# Patient Record
Sex: MALE | Race: WHITE | NOT HISPANIC OR LATINO | Employment: FULL TIME | URBAN - METROPOLITAN AREA
[De-identification: names, ages, dates, MRNs, and addresses within clinical notes are randomized per-mention and may not be internally consistent; named-entity substitution may affect disease eponyms.]

---

## 2018-09-17 ENCOUNTER — APPOINTMENT (OUTPATIENT)
Dept: CT IMAGING | Facility: HOSPITAL | Age: 68
End: 2018-09-17
Attending: EMERGENCY MEDICINE

## 2018-09-17 ENCOUNTER — HOSPITAL ENCOUNTER (EMERGENCY)
Facility: HOSPITAL | Age: 68
Discharge: HOME | End: 2018-09-17
Attending: EMERGENCY MEDICINE | Admitting: EMERGENCY MEDICINE

## 2018-09-17 VITALS
SYSTOLIC BLOOD PRESSURE: 139 MMHG | OXYGEN SATURATION: 98 % | RESPIRATION RATE: 18 BRPM | HEART RATE: 65 BPM | DIASTOLIC BLOOD PRESSURE: 98 MMHG | BODY MASS INDEX: 25.87 KG/M2 | HEIGHT: 69 IN | WEIGHT: 174.7 LBS | TEMPERATURE: 98.1 F

## 2018-09-17 DIAGNOSIS — L03.221 CELLULITIS OF NECK: Primary | ICD-10-CM

## 2018-09-17 LAB
ANION GAP SERPL CALCULATED.3IONS-SCNC: 8.9 MMOL/L
BASOPHILS # BLD AUTO: 0.11 10*3/MM3 (ref 0–0.2)
BASOPHILS NFR BLD AUTO: 1.3 % (ref 0–2)
BUN BLD-MCNC: 14 MG/DL (ref 8–23)
BUN/CREAT SERPL: 15.4 (ref 7–25)
CALCIUM SPEC-SCNC: 9.1 MG/DL (ref 8.8–10.5)
CHLORIDE SERPL-SCNC: 103 MMOL/L (ref 98–107)
CO2 SERPL-SCNC: 26.1 MMOL/L (ref 22–29)
CREAT BLD-MCNC: 0.91 MG/DL (ref 0.76–1.27)
DEPRECATED RDW RBC AUTO: 42.4 FL (ref 37–54)
EOSINOPHIL # BLD AUTO: 0.81 10*3/MM3 (ref 0.1–0.3)
EOSINOPHIL NFR BLD AUTO: 9.6 % (ref 0–4)
ERYTHROCYTE [DISTWIDTH] IN BLOOD BY AUTOMATED COUNT: 12.7 % (ref 11.5–14.5)
GFR SERPL CREATININE-BSD FRML MDRD: 83 ML/MIN/1.73
GLUCOSE BLD-MCNC: 87 MG/DL (ref 65–99)
HCT VFR BLD AUTO: 46 % (ref 42–52)
HGB BLD-MCNC: 15.7 G/DL (ref 14–18)
IMM GRANULOCYTES # BLD: 0.03 10*3/MM3 (ref 0–0.03)
IMM GRANULOCYTES NFR BLD: 0.4 % (ref 0–0.5)
LYMPHOCYTES # BLD AUTO: 1.94 10*3/MM3 (ref 0.6–4.8)
LYMPHOCYTES NFR BLD AUTO: 22.9 % (ref 20–45)
MCH RBC QN AUTO: 31.2 PG (ref 27–31)
MCHC RBC AUTO-ENTMCNC: 34.1 G/DL (ref 31–37)
MCV RBC AUTO: 91.3 FL (ref 80–94)
MONOCYTES # BLD AUTO: 0.48 10*3/MM3 (ref 0–1)
MONOCYTES NFR BLD AUTO: 5.7 % (ref 3–8)
NEUTROPHILS # BLD AUTO: 5.11 10*3/MM3 (ref 1.5–8.3)
NEUTROPHILS NFR BLD AUTO: 60.1 % (ref 45–70)
NRBC BLD MANUAL-RTO: 0 /100 WBC (ref 0–0)
PLATELET # BLD AUTO: 268 10*3/MM3 (ref 140–500)
PMV BLD AUTO: 9 FL (ref 7.4–10.4)
POTASSIUM BLD-SCNC: 4.3 MMOL/L (ref 3.5–5.2)
RBC # BLD AUTO: 5.04 10*6/MM3 (ref 4.7–6.1)
SODIUM BLD-SCNC: 138 MMOL/L (ref 136–145)
WBC NRBC COR # BLD: 8.48 10*3/MM3 (ref 4.8–10.8)

## 2018-09-17 PROCEDURE — 0 IOPAMIDOL PER 1 ML: Performed by: EMERGENCY MEDICINE

## 2018-09-17 PROCEDURE — 99283 EMERGENCY DEPT VISIT LOW MDM: CPT

## 2018-09-17 PROCEDURE — 80048 BASIC METABOLIC PNL TOTAL CA: CPT | Performed by: EMERGENCY MEDICINE

## 2018-09-17 PROCEDURE — 70491 CT SOFT TISSUE NECK W/DYE: CPT

## 2018-09-17 PROCEDURE — 96365 THER/PROPH/DIAG IV INF INIT: CPT

## 2018-09-17 PROCEDURE — 85025 COMPLETE CBC W/AUTO DIFF WBC: CPT | Performed by: EMERGENCY MEDICINE

## 2018-09-17 PROCEDURE — 99282 EMERGENCY DEPT VISIT SF MDM: CPT | Performed by: EMERGENCY MEDICINE

## 2018-09-17 RX ORDER — CLINDAMYCIN HYDROCHLORIDE 300 MG/1
300 CAPSULE ORAL 4 TIMES DAILY
Qty: 28 CAPSULE | Refills: 0 | Status: SHIPPED | OUTPATIENT
Start: 2018-09-17 | End: 2018-09-24

## 2018-09-17 RX ORDER — CLINDAMYCIN PHOSPHATE 600 MG/50ML
600 INJECTION INTRAVENOUS ONCE
Status: COMPLETED | OUTPATIENT
Start: 2018-09-17 | End: 2018-09-17

## 2018-09-17 RX ORDER — CEPHALEXIN 500 MG/1
500 CAPSULE ORAL 4 TIMES DAILY
COMMUNITY
Start: 2018-09-14 | End: 2018-09-17

## 2018-09-17 RX ORDER — SODIUM CHLORIDE 0.9 % (FLUSH) 0.9 %
10 SYRINGE (ML) INJECTION AS NEEDED
Status: DISCONTINUED | OUTPATIENT
Start: 2018-09-17 | End: 2018-09-17 | Stop reason: HOSPADM

## 2018-09-17 RX ADMIN — IOPAMIDOL 100 ML: 755 INJECTION, SOLUTION INTRAVENOUS at 11:48

## 2018-09-17 RX ADMIN — Medication 10 ML: at 10:49

## 2018-09-17 RX ADMIN — CLINDAMYCIN PHOSPHATE 600 MG: 12 INJECTION, SOLUTION INTRAVENOUS at 12:32

## 2018-09-17 NOTE — ED PROVIDER NOTES
"Subjective   Mr. Justo Castellano is a 68-year-old white male who presents secondary to insect bite right neck.  Patient states he was using a bush hog on 9/7/18. He thinks perhaps he was bitten by a bug.  He noticed a small erythematous lesion.  The following day there was a pimple present.  Patient scratched the area.  In the following days the area became larger with associated erythema and induration.  Patient was seen at [facility] on9/14/18.  He was diagnosed with cellulitis and started on Keflex.  The symptoms continued to worsen.  Yesterday the lesion began draining.  Per patient's wife it was a yellowish in color.  The swelling has actually improved today as compared to yesterday.  Patient returned to [facility] today.  The doctor there \"didn't like how it looked and told me I might need an ultrasound\".  Patient presents to the ER for evaluation        History provided by:  Patient  Abscess   Location:  Head/neck  Head/neck abscess location:  R neck  Size:  4 x 8 cm  Abscess quality: induration and redness    Red streaking: no    Duration:  10 days  Chronicity:  New  Context: insect bite/sting    Context comment:  As described above.  Relieved by:  Nothing  Ineffective treatments:  Oral antibiotics  Associated symptoms: no fever, no headaches, no nausea and no vomiting    Associated symptoms comment:  Swelling and drainage.  The drainage has resolved.  Risk factors: no family hx of MRSA and no prior abscess    Risk factors comment:  Prior brown recluse spider bite.      Review of Systems   Constitutional: Negative for chills, diaphoresis and fever.   HENT: Negative for congestion, ear pain and sore throat.    Eyes: Negative for pain and discharge.   Respiratory: Negative for chest tightness, shortness of breath, wheezing and stridor.    Cardiovascular: Negative for chest pain, palpitations and leg swelling.   Gastrointestinal: Negative for abdominal pain, diarrhea, " nausea and vomiting.   Genitourinary: Negative for dysuria, flank pain, frequency and hematuria.   Musculoskeletal: Negative for back pain, myalgias, neck pain and neck stiffness.   Skin: Negative for color change, pallor and rash.   Neurological: Negative for dizziness, seizures, syncope and headaches.   Psychiatric/Behavioral: Negative for agitation and confusion. The patient is not nervous/anxious.    All other systems reviewed and are negative.      History reviewed. No pertinent past medical history.    No Known Allergies    Past Surgical History:   Procedure Laterality Date   • HAND SURGERY Right        History reviewed. No pertinent family history.    Social History     Social History   • Marital status:      Social History Main Topics   • Smoking status: Current Every Day Smoker     Packs/day: 1.00     Types: Cigarettes   • Drug use: No     Other Topics Concern   • Not on file           Objective   Physical Exam   Constitutional: He is oriented to person, place, and time. He appears well-developed and well-nourished. No distress.   68 white male sitting on side of bed.  Patient appears in good overall health.  He is friendly and cooperative.  States he is a .  Accompanied by his wife.   HENT:   Head: Normocephalic and atraumatic.   Right Ear: External ear normal.   Left Ear: External ear normal.   Nose: Nose normal.   Mouth/Throat: Oropharynx is clear and moist.   Neck: Trachea normal, normal range of motion and phonation normal. Neck supple. No spinous process tenderness and no muscular tenderness present. No neck rigidity. No tracheal deviation and normal range of motion present.       Cardiovascular: Normal rate, regular rhythm, normal heart sounds and intact distal pulses.  Exam reveals no gallop and no friction rub.    No murmur heard.  Pulmonary/Chest: Effort normal and breath sounds normal. No stridor. No respiratory distress. He has no wheezes. He has no rales. He exhibits no  tenderness.   Abdominal: Soft. He exhibits no distension.   Musculoskeletal: Normal range of motion.   Neurological: He is alert and oriented to person, place, and time.   Skin: Skin is warm and dry. He is not diaphoretic.   Psychiatric: He has a normal mood and affect. His behavior is normal.   Nursing note and vitals reviewed.      Procedures           ED Course  ED Course as of Sep 17 1434   Mon Sep 17, 2018   1039 Patient has large area of induration right anterior lateral neck overlying sternocleidomastoid.  Will obtain baseline lab work and contrasted CT for further definition of this lesion.  [SS]   1218 CBC and CMP are unremarkable.  CT shows cellulitis but no abscess.  Will give patient IV antibiotics prior to discharge.  Placing patient on a broader spectrum antibiotic.  [SS]   1244 Discussed at length with patient on results, diagnosis treatment and follow-up.  Given clindamycin 600 mg IV. Oral clindamycin for home.  [SS]      ED Course User Index  [SS] Cj Holloway MD      Labs Reviewed   CBC WITH AUTO DIFFERENTIAL - Abnormal; Notable for the following:        Result Value    MCH 31.2 (*)     Eosinophil % 9.6 (*)     Eosinophils, Absolute 0.81 (*)     All other components within normal limits   BASIC METABOLIC PANEL    Narrative:     GFR Normal >60  Chronic Kidney Disease <60  Kidney Failure <15   CBC AND DIFFERENTIAL    Narrative:     The following orders were created for panel order CBC & Differential.  Procedure                               Abnormality         Status                     ---------                               -----------         ------                     CBC Auto Differential[[id number]]        Abnormal            Final result                 Please view results for these tests on the individual orders.       Ct Soft Tissue Neck With Contrast    Result Date: 9/17/2018  Narrative: CT NECK  HISTORY: Right-sided neck swelling for 10 days. Possible insect bite.  TECHNIQUE: Axial  images were obtained through the neck with IV contrast. Multiplanar reformats. Radiation dose reduction techniques were utilized, including automated exposure control and exposure modulation based on body size.  Radiation audit for number of CT and nuclear cardiology exams performed in the last year: 0.  FINDINGS: Visualized lung fields demonstrate emphysema. Salivary glands are normal. Thyroid gland is normal. Larynx is normal. Epiglottis is normal. Prevertebral soft tissues are normal. There is skin thickening and subcutaneous fat stranding on the right side of the neck superficial to sternocleidomastoid. This corresponds to the BB skin marker and presumably reflects cellulitis. No evidence of an abscess. No radiopaque foreign body. No soft tissue gas. No mass or adenopathy is seen. There is a mucous retention cyst in the right maxillary sinus.      Impression: Cellulitis on the right side of the neck. No fluid collection or radiopaque foreign body. No soft tissue gas. The remainder of the neck CT is negative.  This report was finalized on 9/17/2018 11:59 AM by Dr. Samuel Chauhan MD.                MDM  Number of Diagnoses or Management Options  Cellulitis of neck: new and requires workup     Amount and/or Complexity of Data Reviewed  Clinical lab tests: reviewed and ordered  Tests in the radiology section of CPT®: reviewed and ordered    Risk of Complications, Morbidity, and/or Mortality  Presenting problems: moderate  Diagnostic procedures: high  Management options: moderate    Patient Progress  Patient progress: improved        Final diagnoses:   Cellulitis of neck            Cj Holloway MD  09/17/18 0129

## 2018-09-17 NOTE — DISCHARGE INSTRUCTIONS
Medication as directed.  Discontinue Keflex.  Wash area 3 times daily with antibacterial soap, pat dry and apply bacitracin or Neosporin.  Continue until healed.  Follow up with your PCP in 2 days for a recheck, sooner if needed.  Return to ED for worsening symptoms, medical emergencies.

## 2018-09-25 ENCOUNTER — OFFICE VISIT (OUTPATIENT)
Dept: SURGERY | Facility: CLINIC | Age: 68
End: 2018-09-25

## 2018-09-25 VITALS
WEIGHT: 174 LBS | BODY MASS INDEX: 25.77 KG/M2 | HEIGHT: 69 IN | DIASTOLIC BLOOD PRESSURE: 88 MMHG | SYSTOLIC BLOOD PRESSURE: 138 MMHG

## 2018-09-25 DIAGNOSIS — R23.4 SKIN ESCHAR: Primary | ICD-10-CM

## 2018-09-25 PROCEDURE — 99202 OFFICE O/P NEW SF 15 MIN: CPT | Performed by: SURGERY

## 2018-09-25 PROCEDURE — 11042 DBRDMT SUBQ TIS 1ST 20SQCM/<: CPT | Performed by: SURGERY

## 2018-09-25 NOTE — PROGRESS NOTES
PATIENT INFORMATION  Justo Castellano  POSSIBLE INSECT BITE ON NECK X 3 WEEKS, FIRST WENT TO [facility] AND THEY PLACED HIM ON ABX, THEY REFERRED HIM TO THE ER ON 18, THEY PLACED HIM ON CLINDAMYCIN WHICH HE IS STILL TAKING, SWELLING HAS DECREASED, HAS SEEN DR. RIVERA IN THE PAST     - 1950    CHIEF COMPLAINT  Chief Complaint   Patient presents with   • Mass       HISTORY OF PRESENT ILLNESS  HPI was bluish logging and was bitten by an insect on his right neck.  Says he didn't think much of it initially and scratch the area.  Subsequently swelled and a call the office here but I was out of town.  He subsequently went to an urgent care Center and they placed him on Keflex.  The area worsened he went back to the urgent care Center and they referred him to the emergency room here.  They performed a CT scan and blood work and gave him intravenous Cleocin and send him home on by mouth Cleocin which she is currently taking.  He denies any current fevers or chills or drainage from the area for this specific problems in the area.        REVIEW OF SYSTEMS  Review of Systems   Prior insect bite on his back that required debridement.  Otherwise negative      ACTIVE PROBLEMS  There are no active problems to display for this patient.        PAST MEDICAL HISTORY  History reviewed. No pertinent past medical history.      SURGICAL HISTORY  Past Surgical History:   Procedure Laterality Date   • HAND SURGERY Right    • INCISION AND DRAINAGE ABSCESS      DR. RIVERA - INSECT BITE - YEARS AGO         FAMILY HISTORY  History reviewed. No pertinent family history.      SOCIAL HISTORY  Social History     Occupational History   • Not on file.     Social History Main Topics   • Smoking status: Current Every Day Smoker     Packs/day: 1.00     Types: Cigarettes   • Smokeless tobacco: Not on file   • Alcohol use Not on file   • Drug use: No   • Sexual activity: Not on file         CURRENT MEDICATIONS  No current outpatient  "prescriptions on file.    ALLERGIES  Patient has no known allergies.    VITALS  Vitals:    09/25/18 1012   BP: 138/88   Weight: 78.9 kg (174 lb)   Height: 175.3 cm (69\")       LAST RESULTS   Admission on 09/17/2018, Discharged on 09/17/2018   Component Date Value Ref Range Status   • Glucose 09/17/2018 87  65 - 99 mg/dL Final   • BUN 09/17/2018 14  8 - 23 mg/dL Final   • Creatinine 09/17/2018 0.91  0.76 - 1.27 mg/dL Final   • Sodium 09/17/2018 138  136 - 145 mmol/L Final   • Potassium 09/17/2018 4.3  3.5 - 5.2 mmol/L Final   • Chloride 09/17/2018 103  98 - 107 mmol/L Final   • CO2 09/17/2018 26.1  22.0 - 29.0 mmol/L Final   • Calcium 09/17/2018 9.1  8.8 - 10.5 mg/dL Final   • eGFR Non  Amer 09/17/2018 83  >60 mL/min/1.73 Final   • BUN/Creatinine Ratio 09/17/2018 15.4  7.0 - 25.0 Final   • Anion Gap 09/17/2018 8.9  mmol/L Final   • WBC 09/17/2018 8.48  4.80 - 10.80 10*3/mm3 Final   • RBC 09/17/2018 5.04  4.70 - 6.10 10*6/mm3 Final   • Hemoglobin 09/17/2018 15.7  14.0 - 18.0 g/dL Final   • Hematocrit 09/17/2018 46.0  42.0 - 52.0 % Final   • MCV 09/17/2018 91.3  80.0 - 94.0 fL Final   • MCH 09/17/2018 31.2* 27.0 - 31.0 pg Final   • MCHC 09/17/2018 34.1  31.0 - 37.0 g/dL Final   • RDW 09/17/2018 12.7  11.5 - 14.5 % Final   • RDW-SD 09/17/2018 42.4  37.0 - 54.0 fl Final   • MPV 09/17/2018 9.0  7.4 - 10.4 fL Final   • Platelets 09/17/2018 268  140 - 500 10*3/mm3 Final   • Neutrophil % 09/17/2018 60.1  45.0 - 70.0 % Final   • Lymphocyte % 09/17/2018 22.9  20.0 - 45.0 % Final   • Monocyte % 09/17/2018 5.7  3.0 - 8.0 % Final   • Eosinophil % 09/17/2018 9.6* 0.0 - 4.0 % Final   • Basophil % 09/17/2018 1.3  0.0 - 2.0 % Final   • Immature Grans % 09/17/2018 0.4  0.0 - 0.5 % Final   • Neutrophils, Absolute 09/17/2018 5.11  1.50 - 8.30 10*3/mm3 Final   • Lymphocytes, Absolute 09/17/2018 1.94  0.60 - 4.80 10*3/mm3 Final   • Monocytes, Absolute 09/17/2018 0.48  0.00 - 1.00 10*3/mm3 Final   • Eosinophils, Absolute 09/17/2018 " 0.81* 0.10 - 0.30 10*3/mm3 Final   • Basophils, Absolute 09/17/2018 0.11  0.00 - 0.20 10*3/mm3 Final   • Immature Grans, Absolute 09/17/2018 0.03  0.00 - 0.03 10*3/mm3 Final   • nRBC 09/17/2018 0.0  0.0 - 0.0 /100 WBC Final     Ct Soft Tissue Neck With Contrast    Result Date: 9/17/2018  Narrative: CT NECK  HISTORY: Right-sided neck swelling for 10 days. Possible insect bite.  TECHNIQUE: Axial images were obtained through the neck with IV contrast. Multiplanar reformats. Radiation dose reduction techniques were utilized, including automated exposure control and exposure modulation based on body size.  Radiation audit for number of CT and nuclear cardiology exams performed in the last year: 0.  FINDINGS: Visualized lung fields demonstrate emphysema. Salivary glands are normal. Thyroid gland is normal. Larynx is normal. Epiglottis is normal. Prevertebral soft tissues are normal. There is skin thickening and subcutaneous fat stranding on the right side of the neck superficial to sternocleidomastoid. This corresponds to the BB skin marker and presumably reflects cellulitis. No evidence of an abscess. No radiopaque foreign body. No soft tissue gas. No mass or adenopathy is seen. There is a mucous retention cyst in the right maxillary sinus.      Impression: Cellulitis on the right side of the neck. No fluid collection or radiopaque foreign body. No soft tissue gas. The remainder of the neck CT is negative.  This report was finalized on 9/17/2018 11:59 AM by Dr. Samuel Chauhan MD.        PHYSICAL EXAM  Physical Exam solar white male in no active distress.  On his right neck he is a raised 1.2 cm area with full-thickness skin loss eschar centrally.  There is no evidence of cellulitis or abscess.  I reviewed his ER records by Dr. Holloway and his CT scan was reviewed myself.  There was no evidence of abscess is just consistent with a cellulitis.    ASSESSMENT  Cellulitis with central skin loss      PLAN  The risks benefits  and options were discussed with patient in detail.  He should finish out his antibiotic course.  We prepped his neck locally infiltrated 1% lidocaine with epinephrine and this eschar was removed down to normal-appearing subcutaneous tissue.  Hemostasis was assured.  He should clean the area with peroxide and Q-tip daily and a dry dressing to the site.  I will see him back in the office in one week

## 2018-10-02 ENCOUNTER — OFFICE VISIT (OUTPATIENT)
Dept: SURGERY | Facility: CLINIC | Age: 68
End: 2018-10-02

## 2018-10-02 VITALS
HEIGHT: 69 IN | SYSTOLIC BLOOD PRESSURE: 110 MMHG | WEIGHT: 174 LBS | DIASTOLIC BLOOD PRESSURE: 74 MMHG | BODY MASS INDEX: 25.77 KG/M2

## 2018-10-02 DIAGNOSIS — Z09 SURGICAL FOLLOW-UP CARE: Primary | ICD-10-CM

## 2018-10-02 PROCEDURE — 99024 POSTOP FOLLOW-UP VISIT: CPT | Performed by: SURGERY

## 2018-10-02 NOTE — PROGRESS NOTES
1wk f/u debridement of wound, area is improving, finished ABX  Is without complaints.  The area has markedly improved and is a small amount of eschar in the wound is frederick.  He is off antibiotics.  There is no cellulitis or abscess.  The skin will re-epithelialize underneath the eschar I will see him back when necessary.

## 2019-02-08 ENCOUNTER — APPOINTMENT (OUTPATIENT)
Dept: GENERAL RADIOLOGY | Facility: HOSPITAL | Age: 69
End: 2019-02-08

## 2019-02-08 ENCOUNTER — HOSPITAL ENCOUNTER (EMERGENCY)
Facility: HOSPITAL | Age: 69
Discharge: ANOTHER HOSPITAL | End: 2019-02-08
Attending: EMERGENCY MEDICINE | Admitting: EMERGENCY MEDICINE

## 2019-02-08 ENCOUNTER — HOSPITAL ENCOUNTER (INPATIENT)
Facility: HOSPITAL | Age: 69
LOS: 1 days | Discharge: HOME | End: 2019-02-09
Attending: INTERNAL MEDICINE | Admitting: INTERNAL MEDICINE

## 2019-02-08 VITALS
BODY MASS INDEX: 25.18 KG/M2 | DIASTOLIC BLOOD PRESSURE: 87 MMHG | HEIGHT: 69 IN | HEART RATE: 59 BPM | RESPIRATION RATE: 18 BRPM | OXYGEN SATURATION: 97 % | SYSTOLIC BLOOD PRESSURE: 145 MMHG | TEMPERATURE: 98.1 F | WEIGHT: 170 LBS

## 2019-02-08 DIAGNOSIS — R07.9 CHEST PAIN, UNSPECIFIED TYPE: Primary | ICD-10-CM

## 2019-02-08 DIAGNOSIS — R77.8 ELEVATED TROPONIN LEVEL: ICD-10-CM

## 2019-02-08 DIAGNOSIS — O28.8 NST (NON-STRESS TEST) NONREACTIVE: Primary | ICD-10-CM

## 2019-02-08 DIAGNOSIS — I21.4 NSTEMI (NON-ST ELEVATED MYOCARDIAL INFARCTION) (HCC): ICD-10-CM

## 2019-02-08 LAB
ALBUMIN SERPL-MCNC: 4.3 G/DL (ref 3.5–5.2)
ALBUMIN/GLOB SERPL: 1.4 G/DL
ALP SERPL-CCNC: 64 U/L (ref 40–129)
ALT SERPL W P-5'-P-CCNC: 21 U/L (ref 5–41)
ANION GAP SERPL CALCULATED.3IONS-SCNC: 10.7 MMOL/L
APTT PPP: 31.8 SECONDS (ref 24.3–38.1)
AST SERPL-CCNC: 51 U/L (ref 5–40)
BASOPHILS # BLD AUTO: 0.05 10*3/MM3 (ref 0–0.2)
BASOPHILS NFR BLD AUTO: 0.4 % (ref 0–2)
BILIRUB SERPL-MCNC: 0.5 MG/DL (ref 0.2–1.2)
BUN BLD-MCNC: 12 MG/DL (ref 8–23)
BUN/CREAT SERPL: 15.4 (ref 7–25)
CALCIUM SPEC-SCNC: 9 MG/DL (ref 8.8–10.5)
CHLORIDE SERPL-SCNC: 96 MMOL/L (ref 98–107)
CHOLEST SERPL-MCNC: 141 MG/DL (ref 0–200)
CO2 SERPL-SCNC: 25.3 MMOL/L (ref 22–29)
CREAT BLD-MCNC: 0.78 MG/DL (ref 0.76–1.27)
DEPRECATED RDW RBC AUTO: 40.2 FL (ref 37–54)
EOSINOPHIL # BLD AUTO: 0.13 10*3/MM3 (ref 0.1–0.3)
EOSINOPHIL NFR BLD AUTO: 1 % (ref 0–4)
ERYTHROCYTE [DISTWIDTH] IN BLOOD BY AUTOMATED COUNT: 12.2 % (ref 11.5–14.5)
GFR SERPL CREATININE-BSD FRML MDRD: 100 ML/MIN/1.73
GLOBULIN UR ELPH-MCNC: 3.1 GM/DL
GLUCOSE BLD-MCNC: 105 MG/DL (ref 65–99)
HCT VFR BLD AUTO: 45.6 % (ref 42–52)
HDLC SERPL-MCNC: 37 MG/DL (ref 40–60)
HGB BLD-MCNC: 15.7 G/DL (ref 14–18)
IMM GRANULOCYTES # BLD AUTO: 0.05 10*3/MM3 (ref 0–0.03)
IMM GRANULOCYTES NFR BLD AUTO: 0.4 % (ref 0–0.5)
INR PPP: 1.05 (ref 0.9–1.1)
LDLC SERPL CALC-MCNC: 84 MG/DL (ref 0–100)
LDLC/HDLC SERPL: 2.27 {RATIO}
LYMPHOCYTES # BLD AUTO: 1.87 10*3/MM3 (ref 0.6–4.8)
LYMPHOCYTES NFR BLD AUTO: 14.7 % (ref 20–45)
MCH RBC QN AUTO: 30.9 PG (ref 27–31)
MCHC RBC AUTO-ENTMCNC: 34.4 G/DL (ref 31–37)
MCV RBC AUTO: 89.8 FL (ref 80–94)
MONOCYTES # BLD AUTO: 0.7 10*3/MM3 (ref 0–1)
MONOCYTES NFR BLD AUTO: 5.5 % (ref 3–8)
NEUTROPHILS # BLD AUTO: 9.88 10*3/MM3 (ref 1.5–8.3)
NEUTROPHILS NFR BLD AUTO: 78 % (ref 45–70)
NRBC BLD AUTO-RTO: 0 /100 WBC (ref 0–0)
PLATELET # BLD AUTO: 295 10*3/MM3 (ref 140–500)
PMV BLD AUTO: 8.6 FL (ref 7.4–10.4)
POTASSIUM BLD-SCNC: 4 MMOL/L (ref 3.5–5.2)
PROT SERPL-MCNC: 7.4 G/DL (ref 6–8.5)
PROTHROMBIN TIME: 13.4 SECONDS (ref 12.1–15)
RBC # BLD AUTO: 5.08 10*6/MM3 (ref 4.7–6.1)
SODIUM BLD-SCNC: 132 MMOL/L (ref 136–145)
TRIGL SERPL-MCNC: 100 MG/DL (ref 0–150)
TROPONIN T SERPL-MCNC: 0.23 NG/ML (ref 0–0.03)
TROPONIN T SERPL-MCNC: 0.4 NG/ML (ref 0–0.03)
VLDLC SERPL-MCNC: 20 MG/DL (ref 5–40)
WBC NRBC COR # BLD: 12.68 10*3/MM3 (ref 4.8–10.8)

## 2019-02-08 PROCEDURE — 92921: CPT | Performed by: INTERNAL MEDICINE

## 2019-02-08 PROCEDURE — 99284 EMERGENCY DEPT VISIT MOD MDM: CPT | Performed by: EMERGENCY MEDICINE

## 2019-02-08 PROCEDURE — C1887 CATHETER, GUIDING: HCPCS | Performed by: INTERNAL MEDICINE

## 2019-02-08 PROCEDURE — 84484 ASSAY OF TROPONIN QUANT: CPT | Performed by: INTERNAL MEDICINE

## 2019-02-08 PROCEDURE — 93458 L HRT ARTERY/VENTRICLE ANGIO: CPT | Performed by: INTERNAL MEDICINE

## 2019-02-08 PROCEDURE — 85730 THROMBOPLASTIN TIME PARTIAL: CPT | Performed by: EMERGENCY MEDICINE

## 2019-02-08 PROCEDURE — 93005 ELECTROCARDIOGRAM TRACING: CPT | Performed by: INTERNAL MEDICINE

## 2019-02-08 PROCEDURE — 71046 X-RAY EXAM CHEST 2 VIEWS: CPT

## 2019-02-08 PROCEDURE — 99153 MOD SED SAME PHYS/QHP EA: CPT | Performed by: INTERNAL MEDICINE

## 2019-02-08 PROCEDURE — 85025 COMPLETE CBC W/AUTO DIFF WBC: CPT | Performed by: EMERGENCY MEDICINE

## 2019-02-08 PROCEDURE — G0378 HOSPITAL OBSERVATION PER HR: HCPCS

## 2019-02-08 PROCEDURE — 84484 ASSAY OF TROPONIN QUANT: CPT | Performed by: EMERGENCY MEDICINE

## 2019-02-08 PROCEDURE — 99152 MOD SED SAME PHYS/QHP 5/>YRS: CPT | Performed by: INTERNAL MEDICINE

## 2019-02-08 PROCEDURE — 80061 LIPID PANEL: CPT | Performed by: INTERNAL MEDICINE

## 2019-02-08 PROCEDURE — C1725 CATH, TRANSLUMIN NON-LASER: HCPCS | Performed by: INTERNAL MEDICINE

## 2019-02-08 PROCEDURE — 25010000002 FENTANYL CITRATE (PF) 100 MCG/2ML SOLUTION: Performed by: INTERNAL MEDICINE

## 2019-02-08 PROCEDURE — 92928 PRQ TCAT PLMT NTRAC ST 1 LES: CPT | Performed by: INTERNAL MEDICINE

## 2019-02-08 PROCEDURE — 92921 PR PRQ TRLUML CORONARY ANGIOPLASTY ADDL BRANCH: CPT | Performed by: INTERNAL MEDICINE

## 2019-02-08 PROCEDURE — C1894 INTRO/SHEATH, NON-LASER: HCPCS | Performed by: INTERNAL MEDICINE

## 2019-02-08 PROCEDURE — 0 IOPAMIDOL PER 1 ML: Performed by: INTERNAL MEDICINE

## 2019-02-08 PROCEDURE — 99284 EMERGENCY DEPT VISIT MOD MDM: CPT

## 2019-02-08 PROCEDURE — 93010 ELECTROCARDIOGRAM REPORT: CPT | Performed by: INTERNAL MEDICINE

## 2019-02-08 PROCEDURE — C1769 GUIDE WIRE: HCPCS | Performed by: INTERNAL MEDICINE

## 2019-02-08 PROCEDURE — 25010000002 HEPARIN (PORCINE) PER 1000 UNITS: Performed by: INTERNAL MEDICINE

## 2019-02-08 PROCEDURE — 99220 PR INITIAL OBSERVATION CARE/DAY 70 MINUTES: CPT | Performed by: INTERNAL MEDICINE

## 2019-02-08 PROCEDURE — 4A023N7 MEASUREMENT OF CARDIAC SAMPLING AND PRESSURE, LEFT HEART, PERCUTANEOUS APPROACH: ICD-10-PCS | Performed by: INTERNAL MEDICINE

## 2019-02-08 PROCEDURE — C9600 PERC DRUG-EL COR STENT SING: HCPCS | Performed by: INTERNAL MEDICINE

## 2019-02-08 PROCEDURE — B2151ZZ FLUOROSCOPY OF LEFT HEART USING LOW OSMOLAR CONTRAST: ICD-10-PCS | Performed by: INTERNAL MEDICINE

## 2019-02-08 PROCEDURE — C1874 STENT, COATED/COV W/DEL SYS: HCPCS | Performed by: INTERNAL MEDICINE

## 2019-02-08 PROCEDURE — 85347 COAGULATION TIME ACTIVATED: CPT

## 2019-02-08 PROCEDURE — 96374 THER/PROPH/DIAG INJ IV PUSH: CPT

## 2019-02-08 PROCEDURE — 027034Z DILATION OF CORONARY ARTERY, ONE ARTERY WITH DRUG-ELUTING INTRALUMINAL DEVICE, PERCUTANEOUS APPROACH: ICD-10-PCS | Performed by: INTERNAL MEDICINE

## 2019-02-08 PROCEDURE — 80053 COMPREHEN METABOLIC PANEL: CPT | Performed by: EMERGENCY MEDICINE

## 2019-02-08 PROCEDURE — 85610 PROTHROMBIN TIME: CPT | Performed by: EMERGENCY MEDICINE

## 2019-02-08 PROCEDURE — 93005 ELECTROCARDIOGRAM TRACING: CPT | Performed by: EMERGENCY MEDICINE

## 2019-02-08 PROCEDURE — B2111ZZ FLUOROSCOPY OF MULTIPLE CORONARY ARTERIES USING LOW OSMOLAR CONTRAST: ICD-10-PCS | Performed by: INTERNAL MEDICINE

## 2019-02-08 PROCEDURE — 25010000002 MIDAZOLAM PER 1 MG: Performed by: INTERNAL MEDICINE

## 2019-02-08 DEVICE — XIENCE SIERRA™ EVEROLIMUS ELUTING CORONARY STENT SYSTEM 2.50 MM X 28 MM / RAPID-EXCHANGE
Type: IMPLANTABLE DEVICE | Status: FUNCTIONAL
Brand: XIENCE SIERRA™

## 2019-02-08 RX ORDER — CETIRIZINE HYDROCHLORIDE 5 MG/1
5 TABLET ORAL DAILY PRN
COMMUNITY
End: 2019-06-18

## 2019-02-08 RX ORDER — MIDAZOLAM HYDROCHLORIDE 1 MG/ML
INJECTION INTRAMUSCULAR; INTRAVENOUS AS NEEDED
Status: DISCONTINUED | OUTPATIENT
Start: 2019-02-08 | End: 2019-02-08 | Stop reason: HOSPADM

## 2019-02-08 RX ORDER — SODIUM CHLORIDE 0.9 % (FLUSH) 0.9 %
3 SYRINGE (ML) INJECTION EVERY 12 HOURS SCHEDULED
Status: DISCONTINUED | OUTPATIENT
Start: 2019-02-08 | End: 2019-02-09 | Stop reason: HOSPADM

## 2019-02-08 RX ORDER — AMLODIPINE BESYLATE 5 MG/1
5 TABLET ORAL ONCE
Status: DISCONTINUED | OUTPATIENT
Start: 2019-02-08 | End: 2019-02-08

## 2019-02-08 RX ORDER — HYDROCODONE BITARTRATE AND ACETAMINOPHEN 5; 325 MG/1; MG/1
1 TABLET ORAL EVERY 4 HOURS PRN
Status: DISCONTINUED | OUTPATIENT
Start: 2019-02-08 | End: 2019-02-09 | Stop reason: HOSPADM

## 2019-02-08 RX ORDER — SODIUM CHLORIDE 0.9 % (FLUSH) 0.9 %
10 SYRINGE (ML) INJECTION AS NEEDED
Status: DISCONTINUED | OUTPATIENT
Start: 2019-02-08 | End: 2019-02-08 | Stop reason: HOSPADM

## 2019-02-08 RX ORDER — SODIUM CHLORIDE 0.9 % (FLUSH) 0.9 %
3-10 SYRINGE (ML) INJECTION AS NEEDED
Status: DISCONTINUED | OUTPATIENT
Start: 2019-02-08 | End: 2019-02-09 | Stop reason: HOSPADM

## 2019-02-08 RX ORDER — MORPHINE SULFATE 2 MG/ML
1 INJECTION, SOLUTION INTRAMUSCULAR; INTRAVENOUS EVERY 4 HOURS PRN
Status: DISCONTINUED | OUTPATIENT
Start: 2019-02-08 | End: 2019-02-09 | Stop reason: HOSPADM

## 2019-02-08 RX ORDER — ASPIRIN 325 MG
325 TABLET, DELAYED RELEASE (ENTERIC COATED) ORAL DAILY
Status: DISCONTINUED | OUTPATIENT
Start: 2019-02-08 | End: 2019-02-09 | Stop reason: HOSPADM

## 2019-02-08 RX ORDER — ATORVASTATIN CALCIUM 20 MG/1
20 TABLET, FILM COATED ORAL NIGHTLY
Status: DISCONTINUED | OUTPATIENT
Start: 2019-02-08 | End: 2019-02-09 | Stop reason: HOSPADM

## 2019-02-08 RX ORDER — NITROGLYCERIN 5 MG/ML
INJECTION, SOLUTION INTRAVENOUS AS NEEDED
Status: DISCONTINUED | OUTPATIENT
Start: 2019-02-08 | End: 2019-02-08 | Stop reason: HOSPADM

## 2019-02-08 RX ORDER — CLOPIDOGREL BISULFATE 75 MG/1
75 TABLET ORAL DAILY
Status: DISCONTINUED | OUTPATIENT
Start: 2019-02-09 | End: 2019-02-09 | Stop reason: HOSPADM

## 2019-02-08 RX ORDER — HEPARIN SODIUM 1000 [USP'U]/ML
INJECTION, SOLUTION INTRAVENOUS; SUBCUTANEOUS AS NEEDED
Status: DISCONTINUED | OUTPATIENT
Start: 2019-02-08 | End: 2019-02-08 | Stop reason: HOSPADM

## 2019-02-08 RX ORDER — ALUMINA, MAGNESIA, AND SIMETHICONE 2400; 2400; 240 MG/30ML; MG/30ML; MG/30ML
15 SUSPENSION ORAL ONCE
Status: COMPLETED | OUTPATIENT
Start: 2019-02-08 | End: 2019-02-08

## 2019-02-08 RX ORDER — LIDOCAINE HYDROCHLORIDE 20 MG/ML
INJECTION, SOLUTION INFILTRATION; PERINEURAL AS NEEDED
Status: DISCONTINUED | OUTPATIENT
Start: 2019-02-08 | End: 2019-02-08 | Stop reason: HOSPADM

## 2019-02-08 RX ORDER — FENTANYL CITRATE 50 UG/ML
INJECTION, SOLUTION INTRAMUSCULAR; INTRAVENOUS AS NEEDED
Status: DISCONTINUED | OUTPATIENT
Start: 2019-02-08 | End: 2019-02-08 | Stop reason: HOSPADM

## 2019-02-08 RX ORDER — ASPIRIN 325 MG
325 TABLET ORAL ONCE
Status: COMPLETED | OUTPATIENT
Start: 2019-02-08 | End: 2019-02-08

## 2019-02-08 RX ORDER — SODIUM CHLORIDE 9 MG/ML
75 INJECTION, SOLUTION INTRAVENOUS CONTINUOUS
Status: ACTIVE | OUTPATIENT
Start: 2019-02-08 | End: 2019-02-09

## 2019-02-08 RX ORDER — CLOPIDOGREL BISULFATE 75 MG/1
TABLET ORAL AS NEEDED
Status: DISCONTINUED | OUTPATIENT
Start: 2019-02-08 | End: 2019-02-08 | Stop reason: HOSPADM

## 2019-02-08 RX ORDER — NALOXONE HCL 0.4 MG/ML
0.4 VIAL (ML) INJECTION
Status: DISCONTINUED | OUTPATIENT
Start: 2019-02-08 | End: 2019-02-09 | Stop reason: HOSPADM

## 2019-02-08 RX ORDER — ACETAMINOPHEN 325 MG/1
650 TABLET ORAL EVERY 4 HOURS PRN
Status: DISCONTINUED | OUTPATIENT
Start: 2019-02-08 | End: 2019-02-09 | Stop reason: HOSPADM

## 2019-02-08 RX ORDER — SODIUM CHLORIDE 9 MG/ML
INJECTION, SOLUTION INTRAVENOUS CONTINUOUS PRN
Status: COMPLETED | OUTPATIENT
Start: 2019-02-08 | End: 2019-02-08

## 2019-02-08 RX ADMIN — ASPIRIN 325 MG: 325 TABLET, COATED ORAL at 12:43

## 2019-02-08 RX ADMIN — ALUMINUM HYDROXIDE, MAGNESIUM HYDROXIDE, AND DIMETHICONE 15 ML: 400; 400; 40 SUSPENSION ORAL at 10:53

## 2019-02-08 RX ADMIN — FAMOTIDINE 20 MG: 10 INJECTION, SOLUTION INTRAVENOUS at 10:53

## 2019-02-08 RX ADMIN — ATORVASTATIN CALCIUM 20 MG: 20 TABLET, FILM COATED ORAL at 20:29

## 2019-02-08 RX ADMIN — NITROGLYCERIN 0.5 INCH: 20 OINTMENT TOPICAL at 12:55

## 2019-02-08 RX ADMIN — LIDOCAINE HYDROCHLORIDE 15 ML: 20 SOLUTION ORAL; TOPICAL at 10:53

## 2019-02-08 NOTE — ED PROVIDER NOTES
Subjective   History of Present Illness  History of Present Illness    Chief complaint: Chest pain    Location: Low central chest radiating upward    Quality/Severity:  Mild pain    Timing/Duration: Started last night, continues to smoke    Modifying Factors: Sometimes better with belching    Narrative: This patient presents for evaluation of chest pain symptoms.  He says that last night he had a mild headache and then began developing some pain in the low chest that radiated upward towards the top of the chest and back towards the shoulder blades.  He said this was unusual for him.  He was able to rest through the night.  When he woke up this morning he still had the pain in the chest.  It does not cause any nausea.  It does not seem to affect his breathing at all.  He has not had any cough or congestion symptoms.  He has had some belching and he thinks that it may be related to some indigestion.  He has not tried any medications prior to arrival.  He does smoke.  He has no history of cholesterol problems or diabetes problems or cardiac disease.    Associated Symptoms: As above    Review of Systems   Constitutional: Negative for activity change, appetite change, diaphoresis and fever.   Respiratory: Negative for cough and shortness of breath.    Cardiovascular: Positive for chest pain. Negative for palpitations and leg swelling.   Gastrointestinal: Negative for abdominal pain, diarrhea and vomiting.   Musculoskeletal: Negative for neck pain.   Skin: Negative for color change and rash.   Neurological: Positive for headaches. Negative for syncope and numbness.   All other systems reviewed and are negative.      History reviewed. No pertinent past medical history.    No Known Allergies    Past Surgical History:   Procedure Laterality Date   • HAND SURGERY Right    • INCISION AND DRAINAGE ABSCESS      DR. RIVERA - INSECT BITE - YEARS AGO       History reviewed. No pertinent family history.    Social History      Socioeconomic History   • Marital status:      Spouse name: Not on file   • Number of children: Not on file   • Years of education: Not on file   • Highest education level: Not on file   Tobacco Use   • Smoking status: Current Every Day Smoker     Packs/day: 0.50     Types: Cigarettes   Substance and Sexual Activity   • Alcohol use: No     Frequency: Never   • Drug use: No   • Sexual activity: Defer       ED Triage Vitals [02/08/19 1032]   Temp Heart Rate Resp BP SpO2   98.1 °F (36.7 °C) 63 18 164/87 98 %      Temp src Heart Rate Source Patient Position BP Location FiO2 (%)   Oral Monitor Lying Right arm --         Objective   Physical Exam   Constitutional: He is oriented to person, place, and time. He appears well-developed and well-nourished.  Non-toxic appearance. He does not appear ill. No distress.   Smiling, no distress at all.   HENT:   Head: Normocephalic and atraumatic.   Eyes: EOM are normal. Pupils are equal, round, and reactive to light. Right eye exhibits no discharge. Left eye exhibits no discharge.   Neck: Normal range of motion. Neck supple.   Cardiovascular: Normal rate, regular rhythm and normal pulses. Exam reveals no gallop.   No murmur heard.  Pulmonary/Chest: Effort normal. No respiratory distress. He has no decreased breath sounds. He has no wheezes. He has no rhonchi. He has no rales.   Abdominal: Soft. He exhibits no distension, no ascites and no mass. There is no tenderness. There is no rebound and no guarding.   Musculoskeletal: Normal range of motion. He exhibits no edema or deformity.        Right lower leg: Normal. He exhibits no edema.        Left lower leg: Normal. He exhibits no edema.   Neurological: He is alert and oriented to person, place, and time.   Skin: Skin is warm and dry. No ecchymosis and no rash noted. He is not diaphoretic. No erythema. No pallor.   Psychiatric: He has a normal mood and affect. His behavior is normal. Judgment and thought content normal.    Nursing note and vitals reviewed.    EKG           EKG time/Interp time: 1032/1033  Rhythm/Rate: Sinus rhythm, 63 bpm  P waves and PA: P waves are present, 185 ms  QRS, axis: 80 ms, normal axis   ST and T waves: No acute ischemic changes     Independently interpreted by me contemporaneously with treatment    Results for orders placed or performed during the hospital encounter of 02/08/19   Troponin   Result Value Ref Range    Troponin T 0.231 (C) 0.000 - 0.030 ng/mL   CBC Auto Differential   Result Value Ref Range    WBC 12.68 (H) 4.80 - 10.80 10*3/mm3    RBC 5.08 4.70 - 6.10 10*6/mm3    Hemoglobin 15.7 14.0 - 18.0 g/dL    Hematocrit 45.6 42.0 - 52.0 %    MCV 89.8 80.0 - 94.0 fL    MCH 30.9 27.0 - 31.0 pg    MCHC 34.4 31.0 - 37.0 g/dL    RDW 12.2 11.5 - 14.5 %    RDW-SD 40.2 37.0 - 54.0 fl    MPV 8.6 7.4 - 10.4 fL    Platelets 295 140 - 500 10*3/mm3    Neutrophil % 78.0 (H) 45.0 - 70.0 %    Lymphocyte % 14.7 (L) 20.0 - 45.0 %    Monocyte % 5.5 3.0 - 8.0 %    Eosinophil % 1.0 0.0 - 4.0 %    Basophil % 0.4 0.0 - 2.0 %    Immature Grans % 0.4 0.0 - 0.5 %    Neutrophils, Absolute 9.88 (H) 1.50 - 8.30 10*3/mm3    Lymphocytes, Absolute 1.87 0.60 - 4.80 10*3/mm3    Monocytes, Absolute 0.70 0.00 - 1.00 10*3/mm3    Eosinophils, Absolute 0.13 0.10 - 0.30 10*3/mm3    Basophils, Absolute 0.05 0.00 - 0.20 10*3/mm3    Immature Grans, Absolute 0.05 (H) 0.00 - 0.03 10*3/mm3    nRBC 0.0 0.0 - 0.0 /100 WBC   Comprehensive Metabolic Panel   Result Value Ref Range    Glucose 105 (H) 65 - 99 mg/dL    BUN 12 8 - 23 mg/dL    Creatinine 0.78 0.76 - 1.27 mg/dL    Sodium 132 (L) 136 - 145 mmol/L    Potassium 4.0 3.5 - 5.2 mmol/L    Chloride 96 (L) 98 - 107 mmol/L    CO2 25.3 22.0 - 29.0 mmol/L    Calcium 9.0 8.8 - 10.5 mg/dL    Total Protein 7.4 6.0 - 8.5 g/dL    Albumin 4.30 3.50 - 5.20 g/dL    ALT (SGPT) 21 5 - 41 U/L    AST (SGOT) 51 (H) 5 - 40 U/L    Alkaline Phosphatase 64 40 - 129 U/L    Total Bilirubin 0.5 0.2 - 1.2 mg/dL    eGFR  Non African Amer 100 >60 mL/min/1.73    Globulin 3.1 gm/dL    A/G Ratio 1.4 g/dL    BUN/Creatinine Ratio 15.4 7.0 - 25.0    Anion Gap 10.7 mmol/L   Protime-INR   Result Value Ref Range    Protime 13.4 12.1 - 15.0 Seconds    INR 1.05 0.90 - 1.10   aPTT   Result Value Ref Range    PTT 31.8 24.3 - 38.1 seconds       RADIOLOGY        Study: Chest x-ray    Findings: Negative Chest    Interpreted contemporaneously with treatment by Dr. Alejandra, independently viewed by me    Procedures           ED Course  ED Course as of Feb 08 1253   Fri Feb 08, 2019   1251 I have reviewed the EKG and labs and chest x-ray from today's visit.  The EKG was reassuring for no acute ischemic changes.  Honestly, his clinical presentation was most suggestive of GI problems so I gave him a GI cocktail and Pepcid combination.  However, I was surprised to find that his troponin diet was elevated at 0.231.  This makes me wonder if indeed he did have some ischemic problems or acute coronary syndrome issues last night and this morning.  I consult to Dr. Rodriguez from cardiology and she recommends that we transfer the patient to [place] for further studies.  Patient's blood pressure has been acceptable here with systolics in the 120s and 1:30 range.  However it just recently increased to 150s over 90s after I spoke with him so we'll place him on a little bit of nitroglycerin paste to the chest at this time.  I explained him the plan of recommendation for transfer to [place] and he agrees with the plan as outlined.  [HAILEY]      ED Course User Index  [HAILEY] Casey Chambers MD                  MDM  Number of Diagnoses or Management Options  Chest pain, unspecified type:   Elevated troponin level:      Amount and/or Complexity of Data Reviewed  Clinical lab tests: reviewed and ordered  Tests in the radiology section of CPT®: ordered and reviewed  Decide to obtain previous medical records or to obtain history from someone other than the patient:  yes  Review and summarize past medical records: yes  Discuss the patient with other providers: yes (Dr. Rodriguez)  Independent visualization of images, tracings, or specimens: yes    Risk of Complications, Morbidity, and/or Mortality  Presenting problems: moderate  Diagnostic procedures: moderate  Management options: moderate          Final diagnoses:   Chest pain, unspecified type   Elevated troponin level            Casey Chambers MD  02/08/19 6746

## 2019-02-08 NOTE — H&P
Date of Hospital Visit: [unfilled]ENC@  Encounter Provider: Lizz Rodriguez MD  Place of Service: [facility] CARDIOLOGY  Patient Name: Justo Castellano  :1950  Referral Provider: Lizz Rodriguez MD    Chief complaint    Chest burning.     History of Present Illness    This is a 67yo healthy male who smoke 1/2-1ppd of cigarettes who presented to [place] [facility] with chest burning.  About 3-4 weeks ago, he began noticing chest burning and he thought was related to eating. 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  He finished driving bus.  When he got back, he parked the bus and walked to his jeep.  He has severe episode of substernal burning.  He thought he was in have to sit down but was able to get back to his jeep.  Last night he went to [facility].  He felt okay.  He got home and went to bed 8:30 PM.  He will go to sleep at 10:30 PM with severe chest burning.  It was into his shoulders.  He is finally able to go back to sleep.  At 2:30 he woke again with the same chest burning.  It was severe.  He told his wife he needed to go to the emergency department.    He tries to eat very healthy including lots of fresh vegetables.  He drinks only water.  He uses no caffeine.  Uses no alcohol.  He gets physical once a year and these have been fine.  He has no history of treated hyperlipidemia or hypertension.  He's never had diabetes, myocardial infarction, stroke, blood clots, COPD, renal disease or heart failure.  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  In addition, he does [employer] all summer.  He tries to exercise by walking.    This chest burning has not been brought on by eating but more with activity.    There is no family history of premature CAD.  He is related to Nadia ocampo-corie.       Past Medical History:   Diagnosis Date   • Hypertension        Past Surgical History:    Procedure Laterality Date   • HAND SURGERY Right    • INCISION AND DRAINAGE ABSCESS      DR. RIVERA - INSECT BITE - YEARS AGO       Medications Prior to Admission   Medication Sig Dispense Refill Last Dose   • cetirizine (zyrTEC) 5 MG tablet Take 5 mg by mouth Daily As Needed for Allergies.          Current Meds  Scheduled Meds:  sodium chloride 3 mL Intravenous Q12H     Continuous Infusions:   PRN Meds:.sodium chloride    Allergies as of 02/08/2019   • (No Known Allergies)       Social History     Socioeconomic History   • Marital status:      Spouse name: Not on file   • Number of children: Not on file   • Years of education: Not on file   • Highest education level: Not on file   Social Needs   • Financial resource strain: Not on file   • Food insecurity - worry: Not on file   • Food insecurity - inability: Not on file   • Transportation needs - medical: Not on file   • Transportation needs - non-medical: Not on file   Occupational History   • Not on file   Tobacco Use   • Smoking status: Current Every Day Smoker     Packs/day: 0.50     Years: 40.00     Pack years: 20.00     Types: Cigarettes   Substance and Sexual Activity   • Alcohol use: No     Alcohol/week: 0.0 oz     Frequency: Never   • Drug use: No   • Sexual activity: Defer   Other Topics Concern   • Not on file   Social History Narrative   • Not on file       Family History   Problem Relation Age of Onset   • Diabetes Sister        REVIEW OF SYSTEMS:   12 point ROS was performed and is negative except as outlined in HPI          Objective:   Temp:  [98.1 °F (36.7 °C)] 98.1 °F (36.7 °C)  Heart Rate:  [55-69] 61  Resp:  [18] 18  BP: (126-164)/(78-94) 156/87  Body mass index is 25.1 kg/m².  Flowsheet Rows      First Filed Value   Admission Height  No data   Admission Weight  77.1 kg (170 lb) Documented at 02/08/2019 1500        Vitals:    02/08/19 1444   BP: 156/87   Pulse: 61   Resp: 18   Temp: 98.1 °F (36.7 °C)   SpO2: 97%       General Appearance:     Alert, cooperative, in no acute distress   Head:    Normocephalic, without obvious abnormality, atraumatic   Eyes:            Lids and lashes normal, conjunctivae and sclerae normal, no   icterus, no pallor, corneas clear   Ears:    Ears appear intact with no abnormalities noted   Throat:   No oral lesions, no thrush, oral mucosa moist   Neck:   No adenopathy, supple, trachea midline, no thyromegaly, no   carotid bruit, no JVD   Back:     No kyphosis present, no scoliosis present, no skin lesions, erythema or scars, no tenderness, range of motion normal   Lungs:     Clear to auscultation,respirations regular, even and unlabored    Heart:    Regular rhythm and normal rate, normal S1 and S2, no murmur, no gallop, no rub, no click   Chest Wall:    No abnormalities observed   Abdomen:     Normal bowel sounds, no masses, no organomegaly, soft        non-tender, non-distended, no guarding, no rebound  tenderness   Extremities:   Moves all extremities well, no edema, no cyanosis, no redness   Pulses:   Pulses palpable and equal bilaterally. Normal radial, carotid, dorsalis pedis and posterior tibial pulses bilaterally.    Skin:  Psychiatric:   No bleeding, bruising or rash    Alert and oriented x 3, normal mood and affect                 Lab Review:    Results from last 7 days   Lab Units 02/08/19  1145   SODIUM mmol/L 132*   POTASSIUM mmol/L 4.0   CHLORIDE mmol/L 96*   CO2 mmol/L 25.3   BUN mg/dL 12   CREATININE mg/dL 0.78   CALCIUM mg/dL 9.0   BILIRUBIN mg/dL 0.5   ALK PHOS U/L 64   ALT (SGPT) U/L 21   AST (SGOT) U/L 51*   GLUCOSE mg/dL 105*     Results from last 7 days   Lab Units 02/08/19  1044   TROPONIN T ng/mL 0.231*       Results from last 7 days   Lab Units 02/08/19  1044   WBC 10*3/mm3 12.68*   HEMOGLOBIN g/dL 15.7   HEMATOCRIT % 45.6   PLATELETS 10*3/mm3 295     Results from last 7 days   Lab Units 02/08/19  1145   INR  1.05   APTT seconds 31.8             I personally viewed and interpreted the patient's  EKG/Telemetry data  )  Patient Active Problem List   Diagnosis   • NSTEMI (non-ST elevated myocardial infarction) (CMS/[facility])     Assessment and Plan:    1. NSTEMI, cath today.  Asa given early.  Last symptoms at 2:30am. ntgpaste in place.  2. Tobacco use - advised cessation.   3. Unknown lipids.   4. Elevated blood pressure.     Cath today.  Risks/benefits d/w pt. Check echo.     Lizz Rodriguez MD  02/08/19  3:18 PM.  Time spent in reviewing chart, discussion and examination:

## 2019-02-08 NOTE — ED NOTES
Report called per EMTALA form. [facility] in ED to transport patient to [facility] at this time.      UNA Peñaloza James, RN  02/08/19 2293

## 2019-02-09 VITALS
OXYGEN SATURATION: 95 % | TEMPERATURE: 97.7 F | WEIGHT: 171.3 LBS | BODY MASS INDEX: 25.3 KG/M2 | HEART RATE: 69 BPM | DIASTOLIC BLOOD PRESSURE: 68 MMHG | RESPIRATION RATE: 16 BRPM | SYSTOLIC BLOOD PRESSURE: 103 MMHG

## 2019-02-09 LAB
ANION GAP SERPL CALCULATED.3IONS-SCNC: 12.9 MMOL/L
BUN BLD-MCNC: 13 MG/DL (ref 8–23)
BUN/CREAT SERPL: 15.1 (ref 7–25)
CALCIUM SPEC-SCNC: 8.5 MG/DL (ref 8.6–10.5)
CHLORIDE SERPL-SCNC: 103 MMOL/L (ref 98–107)
CO2 SERPL-SCNC: 22.1 MMOL/L (ref 22–29)
CREAT BLD-MCNC: 0.86 MG/DL (ref 0.76–1.27)
DEPRECATED RDW RBC AUTO: 44.3 FL (ref 37–54)
ERYTHROCYTE [DISTWIDTH] IN BLOOD BY AUTOMATED COUNT: 13.1 % (ref 11.5–14.5)
GFR SERPL CREATININE-BSD FRML MDRD: 89 ML/MIN/1.73
GLUCOSE BLD-MCNC: 98 MG/DL (ref 65–99)
HCT VFR BLD AUTO: 43.5 % (ref 40.4–52.2)
HGB BLD-MCNC: 14.5 G/DL (ref 13.7–17.6)
MCH RBC QN AUTO: 31 PG (ref 27–32.7)
MCHC RBC AUTO-ENTMCNC: 33.3 G/DL (ref 32.6–36.4)
MCV RBC AUTO: 92.9 FL (ref 79.8–96.2)
PLATELET # BLD AUTO: 244 10*3/MM3 (ref 140–500)
PMV BLD AUTO: 9.1 FL (ref 6–12)
POTASSIUM BLD-SCNC: 4.2 MMOL/L (ref 3.5–5.2)
RBC # BLD AUTO: 4.68 10*6/MM3 (ref 4.6–6)
SODIUM BLD-SCNC: 138 MMOL/L (ref 136–145)
WBC NRBC COR # BLD: 9.73 10*3/MM3 (ref 4.5–10.7)

## 2019-02-09 PROCEDURE — 93010 ELECTROCARDIOGRAM REPORT: CPT | Performed by: INTERNAL MEDICINE

## 2019-02-09 PROCEDURE — 99217 PR OBSERVATION CARE DISCHARGE MANAGEMENT: CPT | Performed by: INTERNAL MEDICINE

## 2019-02-09 PROCEDURE — 85027 COMPLETE CBC AUTOMATED: CPT | Performed by: INTERNAL MEDICINE

## 2019-02-09 PROCEDURE — 93005 ELECTROCARDIOGRAM TRACING: CPT | Performed by: INTERNAL MEDICINE

## 2019-02-09 PROCEDURE — 80048 BASIC METABOLIC PNL TOTAL CA: CPT | Performed by: INTERNAL MEDICINE

## 2019-02-09 PROCEDURE — G0378 HOSPITAL OBSERVATION PER HR: HCPCS

## 2019-02-09 PROCEDURE — 94799 UNLISTED PULMONARY SVC/PX: CPT

## 2019-02-09 RX ORDER — ATORVASTATIN CALCIUM 20 MG/1
20 TABLET, FILM COATED ORAL NIGHTLY
Qty: 90 TABLET | Refills: 3 | Status: SHIPPED | OUTPATIENT
Start: 2019-02-09 | End: 2019-05-17

## 2019-02-09 RX ORDER — CLOPIDOGREL BISULFATE 75 MG/1
75 TABLET ORAL DAILY
Qty: 90 TABLET | Refills: 3 | Status: SHIPPED | OUTPATIENT
Start: 2019-02-09 | End: 2019-12-07 | Stop reason: SDUPTHER

## 2019-02-09 RX ADMIN — ASPIRIN 325 MG: 325 TABLET, DELAYED RELEASE ORAL at 08:29

## 2019-02-09 RX ADMIN — CLOPIDOGREL 75 MG: 75 TABLET, FILM COATED ORAL at 08:29

## 2019-02-09 NOTE — PLAN OF CARE
Problem: Patient Care Overview  Goal: Plan of Care Review  Outcome: Ongoing (interventions implemented as appropriate)   02/09/19 0816   Coping/Psychosocial   Plan of Care Reviewed With patient   Plan of Care Review   Progress improving   OTHER   Outcome Summary Patient denies complaints. Vitals stable. Discharge paperwork given to and reviewed with patient. Will continue to monitor.

## 2019-02-09 NOTE — DISCHARGE SUMMARY
Date of Discharge:  2/9/2019  Date of Admit: 2/8/2019    Discharge Diagnosis:  NSTEMI (non-ST elevated myocardial infarction) (CMS/[facility])      Hospital Course:     Transferred [facility] on 2/8/19 with non-ST elevation myocardial infarction.  Physician showed normal left main, 20-30% mid LAD, tortuous distal LAD, 10-20% circumflex, 30% mid circumflex, occluded large second obtuse marginal branch, small nondominant RCA with 100% mid occlusion, filled by epicardial vessel from the apical LAD to the RCA.  He underwent stenting of on 2 receiving 0.5 x 20 mm signed serous stent.  This caused 90% stenosis in the circumflex which was then opened with kissing balloon technique into circumflex and OM 2.    He had no chest burning overnight.  no heart racing or skipping.  He overall feels well.    Procedures Performed  Procedure(s):  Left Heart Cath  Coronary angiography  Left ventriculography  Percutaneous Coronary Intervention  Stent ABDULKADIR coronary       Consults     No orders found for last 30 day(s).          Pertinent Test Results:  .      Discharge Physical Exam:    General Appearance No acute distress   Neck No adenopathy, supple, trachea midline, no thyromegaly, no carotid bruit, no JVD   Lungs Clear to auscultation,respirations regular, even and unlabored   Heart Regular rhythm and normal rate, normal S1 and S2, no murmur, no gallop, no rub, no click   Chest wall No abnormalities observed   Abdomen Normal bowel sounds, no masses, no hepatomegaly, soft   Extremities Moves all extremities well, no edema, no cyanosis, no redness   Neurological Alert and oriented x 3     Discharge Medications     Discharge Medications      ASK your doctor about these medications      Instructions Start Date   cetirizine 5 MG tablet  Commonly known as:  zyrTEC   5 mg, Oral, Daily PRN             Discharge Diet:     Activity at Discharge:     Discharge disposition: home    Condition on Discharge: stable    Follow-up Appointments  No  future appointments.      See Serge PEREZ in 1-2 weeks in [place].  Full treadmill stress study in 1 month.  Refer to cardiac rehab    Advised no smoking.     Test Results Pending at Discharge       Lizz Rodriguez MD  02/09/19  7:30 AM    32min spent in reviewing records, discussion and examination of the patient and discussion with other members of the patient's medical team.     Dictated utilizing Dragon dictation

## 2019-02-09 NOTE — DISCHARGE INSTR - LAB
Follow-up with the cardiology office regarding having an echocardiogram performed as an outpatient.

## 2019-02-09 NOTE — PLAN OF CARE
Problem: Patient Care Overview  Goal: Plan of Care Review  Outcome: Ongoing (interventions implemented as appropriate)   02/09/19 0645   Coping/Psychosocial   Plan of Care Reviewed With patient   Plan of Care Review   Progress improving   OTHER   Outcome Summary Vitals stable. No falls. No c/o pain. Right radial cath site C/D/I. Possible d/c this AM. Pt resting comfortably. Monitoring closely.        Problem: Cardiac Catheterization (Diagnostic/Interventional) (Adult)  Goal: Signs and Symptoms of Listed Potential Problems Will be Absent, Minimized or Managed (Cardiac Catheterization)  Outcome: Ongoing (interventions implemented as appropriate)   02/09/19 0645   Goal/Outcome Evaluation   Problems Assessed (Cardiac Catheterization) all   Problems Present (Cardiac Cath) none

## 2019-02-10 ENCOUNTER — READMISSION MANAGEMENT (OUTPATIENT)
Dept: CALL CENTER | Facility: HOSPITAL | Age: 69
End: 2019-02-10

## 2019-02-10 NOTE — OUTREACH NOTE
Prep Survey      Responses   Facility patient discharged from?  [facility]   Is patient eligible?  Yes   Discharge diagnosis   NSTEMI,    Left Heart Cath   Does the patient have one of the following disease processes/diagnoses(primary or secondary)?  Acute MI (STEMI,NSTEMI)   Does the patient have Home health ordered?  No   Is there a DME ordered?  No   Prep survey completed?  Yes          Rachele Flores RN

## 2019-02-11 ENCOUNTER — TELEPHONE (OUTPATIENT)
Dept: CARDIAC REHAB | Facility: HOSPITAL | Age: 69
End: 2019-02-11

## 2019-02-11 ENCOUNTER — TELEPHONE (OUTPATIENT)
Dept: CARDIOLOGY | Facility: CLINIC | Age: 69
End: 2019-02-11

## 2019-02-11 ENCOUNTER — READMISSION MANAGEMENT (OUTPATIENT)
Dept: CALL CENTER | Facility: HOSPITAL | Age: 69
End: 2019-02-11

## 2019-02-11 LAB — ACT BLD: 346 SECONDS (ref 82–152)

## 2019-02-11 NOTE — OUTREACH NOTE
AMI Week 1 Survey      Responses   Facility patient discharged from?  [facility]   Does the patient have one of the following disease processes/diagnoses(primary or secondary)?  Acute MI (STEMI,NSTEMI)   Is there a successful TCM telephone encounter documented?  No   Week 1 attempt successful?  Yes   Call start time  1041   Call end time  1053   Discharge diagnosis   NSTEMI,    Left Heart Cath   Meds reviewed with patient/caregiver?  Yes   Is the patient having any side effects they believe may be caused by any medication additions or changes?  No   Does the patient have all prescriptions related to this admission filled (includes statins,anticoagulants,HTN meds,anti-arrhythmia meds)  Yes   Is the patient taking all medications as directed (includes completed medication regime)?  Yes   Does the patient have a primary care provider?   Yes   Does the patient have an appointment with their PCP,cardiologist,or clinic within 7 days of discharge?  Yes   Has the patient kept scheduled appointments due by today?  Yes   Has home health visited the patient within 72 hours of discharge?  N/A   Psychosocial issues?  No   Did the patient receive a copy of their discharge instructions?  Yes   Nursing interventions  Reviewed instructions with patient   What is the patient's perception of their health status since discharge?  Improving   Nursing interventions  Nurse provided patient education   Is the patient/caregiver able to teach back signs and symptoms of when to call for help immediately:  Sudden chest discomfort, Sudden discomfort in arms, back, neck or jaw, Nausea or vomiting, Dizziness or lightheadedness, Shortness of breath at any time, Sudden sweating or clammy skin, Irregular or rapid heart rate   Nursing interventions  Nurse provided patient education   Is the pateint /caregiver able to teach back the importance of cardiac rehab?  Yes   Nursing interventions  Provided education on importance of cardiac rehab   Is the  patient/caregiver able to teach back lifestyle changes to help prevent MIs  Heart healthy diet, Maintaining a healthy weight   Is the patient/caregiver able to teach back ways to prevent a second heart attack:  Take medications, Follow up with MD, Participate in Cardiac Rehab, Manage risk factors   Is the patient/caregiver able to teach back the hierarchy of who to call/visit for symptoms/problems? PCP, Specialist, Home health nurse, Urgent Care, ED, 911  Yes   Week 1 call completed?  Yes          Jose Martines RN

## 2019-02-11 NOTE — TELEPHONE ENCOUNTER
Pt was here today and wants to know if he can walk a mile and what kind of restriction that he need to know. Pt was hospitalized for chest pain 2/8/19 with non-ST elevation myocardial infarction.  Physician showed normal left main, 20-30% mid LAD, tortuous distal LAD, 10-20% circumflex, 30% mid circumflex, occluded large second obtuse marginal branch, small nondominant RCA with 100% mid occlusion, filled by epicardial vessel from the apical LAD to the RCA.  He underwent stenting of on 2 receiving 0.5 x 20 mm signed serous stent.  This caused 90% stenosis in the circumflex which was then opened with kissing balloon technique into circumflex and OM 2.....Please advise    Pt is scheduled to see Jan APRN on 2/18/19......Pilar PENA

## 2019-02-11 NOTE — TELEPHONE ENCOUNTER
"Called pt secondary to referral for outpatient cardiac rehab.  Pt says he lives in [place] and is anxious to get started in program.  I will route order to [facility].  Pt says he \"threw\" his cigarettes away and \"smoking is over.\"  Also reinforced importance of medication adherence especially ASA and Clopidogrel for stent patency.  Pt understands.  "

## 2019-02-12 DIAGNOSIS — Z95.5 S/P CORONARY ARTERY STENT PLACEMENT: Primary | ICD-10-CM

## 2019-02-13 ENCOUNTER — TELEPHONE (OUTPATIENT)
Dept: CARDIOLOGY | Facility: CLINIC | Age: 69
End: 2019-02-13

## 2019-02-13 NOTE — TELEPHONE ENCOUNTER
Pt called and wants to know if there is any food restrictions. Pt is on Plavix......Please advise.....Pilar DAVE

## 2019-02-14 ENCOUNTER — DOCUMENTATION (OUTPATIENT)
Dept: CARDIOLOGY | Facility: CLINIC | Age: 69
End: 2019-02-14

## 2019-02-14 NOTE — PROGRESS NOTES
Pt walked in to the [place] office today to request a bp check.  Pt's bp in the office 140/80 left hr 68.  Pt does not have a home monitor and can't find a local place to check his bp and is worried about measuring it at home.  Pt recently had stent placed and is on:    Atorvastatin 20 mg qd (pt takes in the morning)  Clopidogrel 75 mg qd  (pt takes in the morning)   mg qd           (pt takes in the morning)    Pt has an post op follow up with CHRIS Munoz on Mon.  I did inform pt that he may want to invest in HBP monitor and he could come to the office and we could instruct him how to use it properly.      Pt would like to know if he should continue to take his meds in the morning or in the evening.  He takes them all about 0600, but develops some dizziness by mid-day that seems to resolve.  Pt also recently stopped smoking Wed.

## 2019-02-18 ENCOUNTER — OFFICE VISIT (OUTPATIENT)
Dept: CARDIOLOGY | Facility: CLINIC | Age: 69
End: 2019-02-18

## 2019-02-18 ENCOUNTER — READMISSION MANAGEMENT (OUTPATIENT)
Dept: CALL CENTER | Facility: HOSPITAL | Age: 69
End: 2019-02-18

## 2019-02-18 VITALS
HEIGHT: 69 IN | WEIGHT: 175 LBS | HEART RATE: 64 BPM | BODY MASS INDEX: 25.92 KG/M2 | SYSTOLIC BLOOD PRESSURE: 140 MMHG | DIASTOLIC BLOOD PRESSURE: 78 MMHG

## 2019-02-18 DIAGNOSIS — I21.4 NSTEMI (NON-ST ELEVATED MYOCARDIAL INFARCTION) (HCC): ICD-10-CM

## 2019-02-18 DIAGNOSIS — Z72.0 TOBACCO ABUSE: ICD-10-CM

## 2019-02-18 DIAGNOSIS — I25.10 CORONARY ARTERY DISEASE INVOLVING NATIVE CORONARY ARTERY OF NATIVE HEART WITHOUT ANGINA PECTORIS: Primary | ICD-10-CM

## 2019-02-18 PROCEDURE — 93000 ELECTROCARDIOGRAM COMPLETE: CPT | Performed by: NURSE PRACTITIONER

## 2019-02-18 PROCEDURE — 99214 OFFICE O/P EST MOD 30 MIN: CPT | Performed by: NURSE PRACTITIONER

## 2019-02-18 NOTE — OUTREACH NOTE
AMI Week 2 Survey      Responses   Facility patient discharged from?  [place]   Does the patient have one of the following disease processes/diagnoses(primary or secondary)?  Acute MI (STEMI,NSTEMI)   Week 2 attempt successful?  Yes   Call start time  1553   Call end time  1554   Meds reviewed with patient/caregiver?  Yes   Is the patient taking all medications as directed (includes completed medication regime)?  Yes   Has the patient kept scheduled appointments due by today?  Yes   What is the patient's perception of their health status since discharge?  Improving   Week 2 call completed?  Yes          Margarita Wetzel RN

## 2019-02-18 NOTE — PROGRESS NOTES
Subjective:     Encounter Date:02/18/2019      Patient ID: Justo Castellano is a 68 y.o. male.    Chief Complaint: hospitali followup for NSTEMI and stents  History of Present Illness  Mr. Del Toro is a new patient to me and I have reviewed his past medical records.  He is a new patient to us and was seen by Dr. Rodriguez in the hospital.    Patient presented to the [facility] ER on 2/8/19 and had an unremarkable EKG but a troponin of 0.231.   He was transferred to [facility] where he had a cardiac catheterization for NSTEMI.   He underwent stenting of OM2 receiving 0.5 x 20 mm signed serous stent.  This caused 90% stenosis in the circumflex which was then opened with kissing balloon technique into circumflex and OM 2.    CORONARY ANGIOGRAPHY: 2/8/19  1.  Left main: 0% stenosis.  The vessel bifurcates into a left anterior descending and left circumflex arteries.  2.  Left anterior descending artery: 0% proximal stenosis.  Proximal vessel gives rise to a moderate caliber first diagonal branch with 0% stenosis.  The mid LAD has diffuse 20-30% stenosis.  Distal LAD is severely tortuous with 0% stenosis.  3.  Left circumflex artery: 10-20% proximal stenosis.  The mid vessel has 30% concentric stenosis.  A small first obtuse marginal branch with a high origin is noted with a 50% ostial stenosis.  There is faint filling seen of a second obtuse marginal branch that appears occluded at the ostium and appears to arise from the mid circumflex artery.  The distal continuation of the circumflex vessel is a large caliber vessel with 0% stenosis that gives rise to a small third obtuse marginal branch followed by a large posterior branch both with no significant disease.  This is a left dominant system  4.  Right coronary artery: Appears to be a small, nondominant vessel with a 90% proximal followed by 100% chronic mid stenosis.  The vessel is filled by a large well-formed epicardial vessel provided by the  apical LAD filling the mid to distal right coronary artery via the RV marginal branch.      LEFT VENTRICULAR HEMODYNAMICS:    1.  1.  Left ventricular pressure: 97/10  2.  Aortic pressure: 98/56     LEFT VENTRICULOGRAPHY:   Normal left ventricle her systolic function and wall motion with an estimated ejection fraction of 65%.     CORONARY INTERVENTION FINDINGS:  PCI CORONARY SEGMENT:  OM2  PRE-STENOSIS: 100%  POST-STENOSIS: 0%  LESION TYPE: C  LOULOU FLOW PRE/POST: 0/3  CULPRIT LESION: yes  DISSECTION: none    He presents today for his one-week follow-up, 2/18/19.  He denies any palpitations, shortness of breath or edema.  He denies any chest pain or chest tightness.  He has had no feelings of lightheadedness or dizziness.  There is been no syncope or presyncopal episodes.  He denies any leg pain, numbness or tingling in his upper or lower extremities.  He denies any snoring.  He denies having any PND, cough, or orthopnea.  He is now on a 325 mg aspirin and Plavix daily.  He denies having any signs and symptoms of unexplained bleeding.    He states prior to this he was walking 1 mile a day and has since resumed walking 1 mile a day without complaints.  He states he has been a lifelong smoker but quit 2 weeks ago.    He was not placed on a beta-blocker postprocedure.  His heart rate today is 64.    The following portions of the patient's history were reviewed and updated as appropriate: allergies, current medications, past family history, past medical history, past social history, past surgical history and problem list.    Review of Systems   Constitution: Negative for weakness and malaise/fatigue.   HENT: Negative for congestion, hoarse voice and sore throat.    Eyes: Negative for blurred vision, double vision, photophobia, vision loss in left eye and vision loss in right eye.   Cardiovascular: Negative for chest pain, dyspnea on exertion, irregular heartbeat, leg swelling, near-syncope, orthopnea, palpitations,  paroxysmal nocturnal dyspnea and syncope.   Respiratory: Negative for cough, hemoptysis, shortness of breath, sleep disturbances due to breathing, snoring, sputum production and wheezing.    Endocrine: Negative.    Hematologic/Lymphatic: Does not bruise/bleed easily.   Skin: Negative for color change, dry skin, poor wound healing and rash.   Musculoskeletal: Negative for back pain, falls, gout, joint pain, joint swelling, muscle cramps and muscle weakness.   Gastrointestinal: Negative for abdominal pain, constipation, diarrhea, dysphagia, melena, nausea and vomiting.   Neurological: Negative for excessive daytime sleepiness, dizziness, headaches, light-headedness, loss of balance, numbness, paresthesias, seizures and vertigo.   Psychiatric/Behavioral: Negative for depression and substance abuse. The patient is not nervous/anxious.        ECG 12 Lead  Date/Time: 2/18/2019 2:04 PM  Performed by: Olga Valdez APRN  Authorized by: Olga Valdez APRN   Comparison: compared with previous ECG   Similar to previous ECG  Rhythm: sinus rhythm  Rate: normal  Conduction: conduction normal  ST Depression: V5 and V6  T elevation: V2 and V3  T inversion: V6, aVF, II and III  QRS axis: normal    Clinical impression: abnormal EKG               Objective:         Physical Exam   Constitutional: He is oriented to person, place, and time. Vital signs are normal. He appears well-developed and well-nourished. No distress.   HENT:   Head: Normocephalic and atraumatic.   Right Ear: Hearing normal.   Left Ear: Hearing normal.   Eyes: Conjunctivae and lids are normal.   Neck: Normal range of motion. Neck supple. No JVD present. Carotid bruit is not present. No thyromegaly present.   Cardiovascular: Normal rate, regular rhythm, S1 normal, S2 normal, normal heart sounds and intact distal pulses.  PMI is not displaced.  Exam reveals no gallop.    No murmur heard.  Pulses:       Carotid pulses are 2+ on the right side, and 2+ on the left  "side.       Radial pulses are 2+ on the right side, and 2+ on the left side.        Dorsalis pedis pulses are 2+ on the right side, and 2+ on the left side.        Posterior tibial pulses are 2+ on the right side, and 2+ on the left side.   Pulmonary/Chest: Effort normal and breath sounds normal. No respiratory distress. He has no wheezes. He has no rhonchi. He has no rales. He exhibits no tenderness.   Abdominal: Soft. Normal appearance and bowel sounds are normal. He exhibits no distension, no abdominal bruit and no mass. There is no tenderness.   Musculoskeletal: Normal range of motion.   Exhibits no edema or deformity   Lymphadenopathy:     He has no cervical adenopathy.   Neurological: He is alert and oriented to person, place, and time. No cranial nerve deficit. Coordination and gait normal.   Oriented to person, place and time.   Skin: Skin is warm, dry and intact. No rash noted. He is not diaphoretic. No cyanosis. Nails show no clubbing.   Psychiatric: He has a normal mood and affect. His speech is normal and behavior is normal. Judgment and thought content normal. Cognition and memory are normal.     Vitals:    02/18/19 0948   BP: 140/78   Pulse: 64   Weight: 79.4 kg (175 lb)   Height: 175.3 cm (69\")           Lab Review:       Assessment:          Diagnosis Plan   1. Coronary artery disease involving native coronary artery of native heart without angina pectoris     2. NSTEMI (non-ST elevated myocardial infarction) (CMS/[facility])     3. Tobacco abuse            Plan:       1/2.  CAD - s/p NSTEMI with stenting 2/9/19.  Denies angina, walking 1 mile a day.  He has stopped smoking 2 weeks ago. He was not discharged on a BB, HR in low 60s at time of visit.  Referral for Cardiac rehab has already been made.    Coronary Artery Disease  Assessment  • The patient has no angina    Plan  • Lifestyle modifications discussed include adhering to a heart healthy diet, avoidance of tobacco products, maintenance of a healthy " weight, medication compliance, regular exercise and regular monitoring of cholesterol and blood pressure    Subjective - Objective  • There is a history of past MI  • There has been a previous stent procedure using ABDULKADIR  • Current antiplatelet therapy includes aspirin 325 mg and clopidogrel 75 mg    3.  Tobaco abuse - states he quit 2 weeks ago.      RTO 1 month with Dr. Jennifer Valdez, APRN      Current Outpatient Medications:   •  aspirin  MG EC tablet, Take 1 tablet by mouth Daily., Disp: 90 tablet, Rfl: 3  •  atorvastatin (LIPITOR) 20 MG tablet, Take 1 tablet by mouth Every Night., Disp: 90 tablet, Rfl: 3  •  cetirizine (zyrTEC) 5 MG tablet, Take 5 mg by mouth Daily As Needed for Allergies., Disp: , Rfl:   •  clopidogrel (PLAVIX) 75 MG tablet, Take 1 tablet by mouth Daily., Disp: 90 tablet, Rfl: 3

## 2019-02-19 ENCOUNTER — TELEPHONE (OUTPATIENT)
Dept: CARDIAC REHAB | Facility: HOSPITAL | Age: 69
End: 2019-02-19

## 2019-02-25 ENCOUNTER — OFFICE VISIT (OUTPATIENT)
Dept: CARDIAC REHAB | Facility: HOSPITAL | Age: 69
End: 2019-02-25

## 2019-02-25 ENCOUNTER — TELEPHONE (OUTPATIENT)
Dept: CARDIOLOGY | Facility: CLINIC | Age: 69
End: 2019-02-25

## 2019-02-25 VITALS — HEIGHT: 69 IN | WEIGHT: 174.7 LBS | BODY MASS INDEX: 25.87 KG/M2

## 2019-02-25 DIAGNOSIS — I21.4 NSTEMI (NON-ST ELEVATED MYOCARDIAL INFARCTION) (HCC): Primary | ICD-10-CM

## 2019-02-25 DIAGNOSIS — I25.10 CORONARY ARTERY DISEASE INVOLVING NATIVE CORONARY ARTERY OF NATIVE HEART WITHOUT ANGINA PECTORIS: Primary | ICD-10-CM

## 2019-02-25 DIAGNOSIS — Z95.5 STATUS POST INSERTION OF DRUG ELUTING CORONARY ARTERY STENT: ICD-10-CM

## 2019-02-25 PROCEDURE — 93797 PHYS/QHP OP CAR RHAB WO ECG: CPT

## 2019-02-25 NOTE — PROGRESS NOTES
Cardiac Rehab Initial Assessment      Name: Justo Castellano  :1950 Allergies:Patient has no known allergies.   MRN: [id number] 69 y.o. Physician: Destin Guillaume MD   Primary Diagnosis:    Diagnosis Plan   1. NSTEMI (non-ST elevated myocardial infarction) (CMS/HCC)     2. Status post insertion of drug eluting coronary artery stent      Event Date:19 Specialist: Dr. Smith   Secondary Diagnosis:  Risk Stratification:Moderate Risk Note Author: Miguelina Avery RN     Cardiovascular History: Comments first heart event     EXERCISE AT HOME  no  Back to walking 3/4 mile daily  N/A    EF: 65%      Source: 19        Ambulatory Status:Independent  Ambulatory Fall Risk Assessed on Initial Visit: yes 6 Minute Walk Pre- Cardiac Rehab:  Distance:1550ft      RPE:1  Max. HR: 80       SPO2:97    MET: 3.2  MPH: 2.9             RPD: 0  Resting BP: 142/90 LA, 142/88 RA    Peak BP: 158/82  Recovery BP: 140/82  Comments: Tolerated well.      NUTRITION  Lipids:yes If yes, labs as follows;  Total: No components found for: CHOLESTEROL  HDL:   HDL Cholesterol   Date Value Ref Range Status   2019 37 (L) 40 - 60 mg/dL Final    Lipids continued:  LDL:  LDL Cholesterol    Date Value Ref Range Status   2019 84 0 - 100 mg/dL Final     Triglyceride: No components found for: TRIGLYCERIDE   Weight Management:                 Weight: 174.7  Height: 69                         BMI: Body mass index is 25.8 kg/m².  Waist Circumference: 39.5  inches   Alcohol Use: defer Diabetes:No    Last HGBA1C with date if applicable:No components found for: A1C         SOCIAL HISTORY  Social History     Socioeconomic History   • Marital status:      Spouse name: Not on file   • Number of children: Not on file   • Years of education: Not on file   • Highest education level: Not on file   Tobacco Use   • Smoking status: Current Every Day Smoker     Packs/day: 0.50     Years: 40.00     Pack years: 20.00     Types: Cigarettes    Substance and Sexual Activity   • Alcohol use: No     Alcohol/week: 0.0 oz     Frequency: Never   • Drug use: No   • Sexual activity: Defer       Educational Level (choose one that applies) high school diploma/GED Learning Barriers:Ready to Learn    Family Support:yes    Living Arrangement: lives with their spouse    Risk Factors: Stress  No, Clinical Depression  No, Heredity  No If Yes none, Hyperlipidemia  No, Diabetes  No If Yes: Do you check blood glucose daily  N/A Today's glucose level na, Exercise prior to event  No If Yes: Activity na, Minutes per eusebia, Days per week na, Obesity  No and 0   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  Lifting 25-50 pounds, raking hoeing and digging.  Uses a alaeh and mows at home.     Walks about 5 days per week.  Tobacco Adjunct: No  Quit smoking 2/7/19.  Had smoked 18 years 1/2 ppd               Comorbidities: none     PSYCHOSOCIAL  Clinical Depression: no    Stress: no     Assess presence or absence of depression using a valid screening tool: yes      PHYSICAL ASSESSMENT  Influenza vaccine: yes  Pneumococcal vaccine: no          Angina: no    Describe angina scale of 0 - 4: 0 = none    Today are you having incisional pain? N/A. If, Yes, Scale: 0        Today are you having any other pain? No. If, Yes, Scale: 0     Diagnosed with Hypertension:no    Heart Sounds: regular without murmur     Lung Sounds: air entry:  good some rhonchi right upper lobe.         Assessment: denies any ankle edema  Had not been on any medicine before.  Since quitting smoking and being on some new medicine he feels as if he is hyped up feeling occasionally.   Orthopedic Problems: none    Are you being hurt, hit, or frightened by anyone at home or in your life? no    Are you being neglected by a caregiver? N/A Shoulder flexibility/Range of motion: Average     Recommended arm activity: Any    Chair sit and reach within: 4 inches   Leg flexibility:  Average    Leg Strength/Balance/Five times sit to stand: 8 seconds.     Chose one: Average    Recommended stretching: Standing    Assessment: good balance and flexibility demonstrated    Family attends [place]: no Time of arrival:0830  Time of departure:0945     Patient Goals: To be able to to walk 1 to 1&1/2 miles  which is a hilly terrain 5 days per week.  Will try to participate in an exercise program 3 days per week.  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          2/25/2019  10:06 AM  Miguelina Avery RN

## 2019-02-25 NOTE — TELEPHONE ENCOUNTER
PT came in the office and wanted to know it he would be able to return to work while he is doing his card rehab. If so what would his retractions would be. He said that he has been walking 1.5 miles a day.     Also he said that the paper work from the hospital said that he needed to have a echo and stress test 30 day from the hospital stay (2/8/2019).  There has not been any orders put in for those. Should those be put in or should be wait till he sees Dr. Good on 3/22/2019.    Phone number [phone]

## 2019-02-26 ENCOUNTER — READMISSION MANAGEMENT (OUTPATIENT)
Dept: CALL CENTER | Facility: HOSPITAL | Age: 69
End: 2019-02-26

## 2019-02-26 NOTE — OUTREACH NOTE
AMI Week 3 Survey      Responses   Facility patient discharged from?  [facility]   Does the patient have one of the following disease processes/diagnoses(primary or secondary)?  Acute MI (STEMI,NSTEMI)   Week 3 attempt successful?  No   Unsuccessful attempts  Attempt 1          Margarita Wetzel RN

## 2019-02-27 ENCOUNTER — TREATMENT (OUTPATIENT)
Dept: CARDIAC REHAB | Facility: HOSPITAL | Age: 69
End: 2019-02-27

## 2019-02-27 DIAGNOSIS — I21.4 NSTEMI (NON-ST ELEVATED MYOCARDIAL INFARCTION) (HCC): Primary | ICD-10-CM

## 2019-02-27 DIAGNOSIS — Z95.5 STATUS POST INSERTION OF DRUG ELUTING CORONARY ARTERY STENT: ICD-10-CM

## 2019-02-27 PROCEDURE — 93798 PHYS/QHP OP CAR RHAB W/ECG: CPT

## 2019-03-01 ENCOUNTER — TREATMENT (OUTPATIENT)
Dept: CARDIAC REHAB | Facility: HOSPITAL | Age: 69
End: 2019-03-01

## 2019-03-01 DIAGNOSIS — I21.4 NSTEMI (NON-ST ELEVATED MYOCARDIAL INFARCTION) (HCC): Primary | ICD-10-CM

## 2019-03-01 PROCEDURE — 93798 PHYS/QHP OP CAR RHAB W/ECG: CPT

## 2019-03-04 ENCOUNTER — HOSPITAL ENCOUNTER (OUTPATIENT)
Dept: CARDIOLOGY | Facility: HOSPITAL | Age: 69
Discharge: HOME | End: 2019-03-04

## 2019-03-04 ENCOUNTER — HOSPITAL ENCOUNTER (OUTPATIENT)
Dept: CARDIOLOGY | Facility: HOSPITAL | Age: 69
Setting detail: HOSPITAL OUTPATIENT SURGERY
Discharge: HOME | End: 2019-03-04
Admitting: INTERNAL MEDICINE

## 2019-03-04 VITALS — WEIGHT: 174 LBS | BODY MASS INDEX: 25.77 KG/M2 | HEIGHT: 69 IN

## 2019-03-04 DIAGNOSIS — I25.10 CORONARY ARTERY DISEASE INVOLVING NATIVE CORONARY ARTERY OF NATIVE HEART WITHOUT ANGINA PECTORIS: ICD-10-CM

## 2019-03-04 LAB
BH CV ECHO MEAS - ACS: 2 CM
BH CV ECHO MEAS - AO MAX PG (FULL): 1.3 MMHG
BH CV ECHO MEAS - AO MAX PG: 4.5 MMHG
BH CV ECHO MEAS - AO MEAN PG (FULL): 1 MMHG
BH CV ECHO MEAS - AO MEAN PG: 2 MMHG
BH CV ECHO MEAS - AO ROOT AREA (BSA CORRECTED): 1.8
BH CV ECHO MEAS - AO ROOT AREA: 10.2 CM^2
BH CV ECHO MEAS - AO ROOT DIAM: 3.6 CM
BH CV ECHO MEAS - AO V2 MAX: 106 CM/SEC
BH CV ECHO MEAS - AO V2 MEAN: 69.9 CM/SEC
BH CV ECHO MEAS - AO V2 VTI: 22.8 CM
BH CV ECHO MEAS - AVA(I,A): 2.7 CM^2
BH CV ECHO MEAS - AVA(I,D): 2.7 CM^2
BH CV ECHO MEAS - AVA(V,A): 2.9 CM^2
BH CV ECHO MEAS - AVA(V,D): 2.9 CM^2
BH CV ECHO MEAS - BSA(HAYCOCK): 2 M^2
BH CV ECHO MEAS - BSA: 1.9 M^2
BH CV ECHO MEAS - BZI_BMI: 25.7 KILOGRAMS/M^2
BH CV ECHO MEAS - BZI_METRIC_HEIGHT: 175.3 CM
BH CV ECHO MEAS - BZI_METRIC_WEIGHT: 78.9 KG
BH CV ECHO MEAS - EDV(CUBED): 100.5 ML
BH CV ECHO MEAS - EDV(MOD-SP2): 56.4 ML
BH CV ECHO MEAS - EDV(MOD-SP4): 80.3 ML
BH CV ECHO MEAS - EDV(TEICH): 99.8 ML
BH CV ECHO MEAS - EF(CUBED): 70.4 %
BH CV ECHO MEAS - EF(MOD-BP): 57 %
BH CV ECHO MEAS - EF(MOD-SP2): 57.1 %
BH CV ECHO MEAS - EF(MOD-SP4): 56 %
BH CV ECHO MEAS - EF(TEICH): 62 %
BH CV ECHO MEAS - ESV(CUBED): 29.8 ML
BH CV ECHO MEAS - ESV(MOD-SP2): 24.2 ML
BH CV ECHO MEAS - ESV(MOD-SP4): 35.3 ML
BH CV ECHO MEAS - ESV(TEICH): 37.9 ML
BH CV ECHO MEAS - FS: 33.3 %
BH CV ECHO MEAS - IVS/LVPW: 0.88
BH CV ECHO MEAS - IVSD: 0.86 CM
BH CV ECHO MEAS - LA DIMENSION: 3.3 CM
BH CV ECHO MEAS - LA/AO: 0.92
BH CV ECHO MEAS - LAT PEAK E' VEL: 9 CM/SEC
BH CV ECHO MEAS - LV DIASTOLIC VOL/BSA (35-75): 41.2 ML/M^2
BH CV ECHO MEAS - LV MASS(C)D: 144.9 GRAMS
BH CV ECHO MEAS - LV MASS(C)DI: 74.4 GRAMS/M^2
BH CV ECHO MEAS - LV MAX PG: 3.1 MMHG
BH CV ECHO MEAS - LV MEAN PG: 1 MMHG
BH CV ECHO MEAS - LV SYSTOLIC VOL/BSA (12-30): 18.1 ML/M^2
BH CV ECHO MEAS - LV V1 MAX: 88.7 CM/SEC
BH CV ECHO MEAS - LV V1 MEAN: 50.7 CM/SEC
BH CV ECHO MEAS - LV V1 VTI: 17.8 CM
BH CV ECHO MEAS - LVIDD: 4.7 CM
BH CV ECHO MEAS - LVIDS: 3.1 CM
BH CV ECHO MEAS - LVLD AP2: 7.1 CM
BH CV ECHO MEAS - LVLD AP4: 7.1 CM
BH CV ECHO MEAS - LVLS AP2: 5.4 CM
BH CV ECHO MEAS - LVLS AP4: 5.3 CM
BH CV ECHO MEAS - LVOT AREA (M): 3.5 CM^2
BH CV ECHO MEAS - LVOT AREA: 3.5 CM^2
BH CV ECHO MEAS - LVOT DIAM: 2.1 CM
BH CV ECHO MEAS - LVPWD: 0.98 CM
BH CV ECHO MEAS - MED PEAK E' VEL: 6 CM/SEC
BH CV ECHO MEAS - MV A DUR: 0.11 SEC
BH CV ECHO MEAS - MV A MAX VEL: 63.1 CM/SEC
BH CV ECHO MEAS - MV DEC TIME: 0.21 SEC
BH CV ECHO MEAS - MV E MAX VEL: 52.4 CM/SEC
BH CV ECHO MEAS - MV E/A: 0.83
BH CV ECHO MEAS - PA ACC TIME: 0.06 SEC
BH CV ECHO MEAS - PA MAX PG (FULL): 1.8 MMHG
BH CV ECHO MEAS - PA MAX PG: 2.8 MMHG
BH CV ECHO MEAS - PA PR(ACCEL): 53.8 MMHG
BH CV ECHO MEAS - PA V2 MAX: 84.2 CM/SEC
BH CV ECHO MEAS - PULM A REVS DUR: 0.12 SEC
BH CV ECHO MEAS - PULM A REVS VEL: 41.7 CM/SEC
BH CV ECHO MEAS - PULM DIAS VEL: 37.8 CM/SEC
BH CV ECHO MEAS - PULM S/D: 0.85
BH CV ECHO MEAS - PULM SYS VEL: 32 CM/SEC
BH CV ECHO MEAS - PVA(V,A): 1.7 CM^2
BH CV ECHO MEAS - PVA(V,D): 1.7 CM^2
BH CV ECHO MEAS - QP/QS: 0.48
BH CV ECHO MEAS - RAP SYSTOLE: 3 MMHG
BH CV ECHO MEAS - RV MAX PG: 1 MMHG
BH CV ECHO MEAS - RV MEAN PG: 1 MMHG
BH CV ECHO MEAS - RV V1 MAX: 50.2 CM/SEC
BH CV ECHO MEAS - RV V1 MEAN: 33.1 CM/SEC
BH CV ECHO MEAS - RV V1 VTI: 10.4 CM
BH CV ECHO MEAS - RVOT AREA: 2.8 CM^2
BH CV ECHO MEAS - RVOT DIAM: 1.9 CM
BH CV ECHO MEAS - RVSP: 17 MMHG
BH CV ECHO MEAS - SI(AO): 119.2 ML/M^2
BH CV ECHO MEAS - SI(CUBED): 36.3 ML/M^2
BH CV ECHO MEAS - SI(LVOT): 31.7 ML/M^2
BH CV ECHO MEAS - SI(MOD-SP2): 16.5 ML/M^2
BH CV ECHO MEAS - SI(MOD-SP4): 23.1 ML/M^2
BH CV ECHO MEAS - SI(TEICH): 31.8 ML/M^2
BH CV ECHO MEAS - SV(AO): 232.1 ML
BH CV ECHO MEAS - SV(CUBED): 70.8 ML
BH CV ECHO MEAS - SV(LVOT): 61.7 ML
BH CV ECHO MEAS - SV(MOD-SP2): 32.2 ML
BH CV ECHO MEAS - SV(MOD-SP4): 45 ML
BH CV ECHO MEAS - SV(RVOT): 29.5 ML
BH CV ECHO MEAS - SV(TEICH): 61.9 ML
BH CV ECHO MEAS - TAPSE (>1.6): 2.3 CM2
BH CV ECHO MEAS - TR MAX VEL: 219 CM/SEC
BH CV ECHO MEASUREMENTS AVERAGE E/E' RATIO: 6.99
BH CV STRESS BP STAGE 1: NORMAL
BH CV STRESS BP STAGE 2: NORMAL
BH CV STRESS BP STAGE 3: NORMAL
BH CV STRESS DURATION MIN STAGE 1: 3
BH CV STRESS DURATION MIN STAGE 2: 3
BH CV STRESS DURATION MIN STAGE 3: 2
BH CV STRESS DURATION SEC STAGE 1: 0
BH CV STRESS DURATION SEC STAGE 2: 0
BH CV STRESS DURATION SEC STAGE 3: 47
BH CV STRESS GRADE STAGE 1: 10
BH CV STRESS GRADE STAGE 2: 12
BH CV STRESS GRADE STAGE 3: 14
BH CV STRESS HR STAGE 1: 90
BH CV STRESS HR STAGE 2: 108
BH CV STRESS HR STAGE 3: 247
BH CV STRESS METS STAGE 1: 5
BH CV STRESS METS STAGE 2: 7.5
BH CV STRESS METS STAGE 3: 10
BH CV STRESS PROTOCOL 1: NORMAL
BH CV STRESS RECOVERY BP: NORMAL MMHG
BH CV STRESS RECOVERY HR: 79 BPM
BH CV STRESS SPEED STAGE 1: 1.7
BH CV STRESS SPEED STAGE 2: 2.5
BH CV STRESS SPEED STAGE 3: 3.4
BH CV STRESS STAGE 1: 1
BH CV STRESS STAGE 2: 2
BH CV STRESS STAGE 3: 3
BH CV VAS BP RIGHT ARM: NORMAL MMHG
BH CV XLRA - RV BASE: 3.8 CM
BH CV XLRA - TDI S': 12 CM/SEC
LEFT ATRIUM VOLUME INDEX: 19 ML/M2
MAXIMAL PREDICTED HEART RATE: 153 BPM
MAXIMAL PREDICTED HEART RATE: 153 BPM
PERCENT MAX PREDICTED HR: 86.93 %
STRESS BASELINE BP: NORMAL MMHG
STRESS BASELINE HR: 66 BPM
STRESS O2 SAT REST: 99 %
STRESS PERCENT HR: 102 %
STRESS POST ESTIMATED WORKLOAD: 10.2 METS
STRESS POST EXERCISE DUR MIN: 8 MIN
STRESS POST EXERCISE DUR SEC: 48 SEC
STRESS POST O2 SAT PEAK: 99 %
STRESS POST PEAK BP: NORMAL MMHG
STRESS POST PEAK HR: 133 BPM
STRESS TARGET HR: 130 BPM
STRESS TARGET HR: 130 BPM

## 2019-03-04 PROCEDURE — 93306 TTE W/DOPPLER COMPLETE: CPT

## 2019-03-04 PROCEDURE — 93306 TTE W/DOPPLER COMPLETE: CPT | Performed by: INTERNAL MEDICINE

## 2019-03-04 PROCEDURE — 93017 CV STRESS TEST TRACING ONLY: CPT

## 2019-03-04 PROCEDURE — 93018 CV STRESS TEST I&R ONLY: CPT | Performed by: INTERNAL MEDICINE

## 2019-03-04 PROCEDURE — 93016 CV STRESS TEST SUPVJ ONLY: CPT | Performed by: INTERNAL MEDICINE

## 2019-03-05 ENCOUNTER — TELEPHONE (OUTPATIENT)
Dept: CARDIOLOGY | Facility: CLINIC | Age: 69
End: 2019-03-05

## 2019-03-06 ENCOUNTER — TELEPHONE (OUTPATIENT)
Dept: CARDIOLOGY | Facility: CLINIC | Age: 69
End: 2019-03-06

## 2019-03-06 ENCOUNTER — TREATMENT (OUTPATIENT)
Dept: CARDIAC REHAB | Facility: HOSPITAL | Age: 69
End: 2019-03-06

## 2019-03-06 DIAGNOSIS — I21.4 NSTEMI (NON-ST ELEVATED MYOCARDIAL INFARCTION) (HCC): Primary | ICD-10-CM

## 2019-03-06 DIAGNOSIS — Z95.5 STATUS POST INSERTION OF DRUG ELUTING CORONARY ARTERY STENT: ICD-10-CM

## 2019-03-06 PROCEDURE — 93798 PHYS/QHP OP CAR RHAB W/ECG: CPT

## 2019-03-06 NOTE — PROGRESS NOTES
CARDIAC/PULMONARY REHAB NUTRITION EDUCATION/ASSESSMENT      Attended Heart and Diet education class. Reviewed AHA guidelines and other recommended guidelines and strategies by allied heat[employer] organization. Presented with information and suggestions form (NHLBI)DASH diet guidelines. Grocery shopping information and product review was provided. Heart heathy diet strategies were reviewed              1:23 PM  3/6/2019  Gretel Barragan RD

## 2019-03-06 NOTE — TELEPHONE ENCOUNTER
----- Message from CHRIS Stokes sent at 3/6/2019  3:46 PM EST -----  Please call patient and let him know that his stress test came back normal.  He was noted to have some extra heartbeats from the bottom part of his heart called PVCs.  Does he feel them?  He is not on beta-blocker therapy.

## 2019-03-06 NOTE — TELEPHONE ENCOUNTER
Pt came in the office and said that someone called him about the echo that he had at the end of last week. He said that they told him everything look great. He said that she was going to send a letter to Dr. Rodriguez about him going back to work. Pt could not remember who he talked to about his echo. He said that he talked to his work and they would need a letter from Dr. Rodriguez herself. I told him that she would not be back in the office till Monday.

## 2019-03-06 NOTE — PROGRESS NOTES
CARDIAC/PULMONARY REHAB NUTRITION EDUCATION/ASSESSMENT    EDUCATION:    Attended Heart and Diet education class. Reviewed AHA guidelines and other recommended guidelines and strategies by [employer] organization. Presented with information and suggestions form (NHLBI)DASH diet guidelines. Grocery shopping information and product review was provided. Heart heathy diet strategies were reviewed.          12:02 PM  3/6/2019  Gretel Barragan RD

## 2019-03-08 ENCOUNTER — TREATMENT (OUTPATIENT)
Dept: CARDIAC REHAB | Facility: HOSPITAL | Age: 69
End: 2019-03-08

## 2019-03-08 DIAGNOSIS — I21.4 NSTEMI (NON-ST ELEVATED MYOCARDIAL INFARCTION) (HCC): Primary | ICD-10-CM

## 2019-03-08 PROCEDURE — 93798 PHYS/QHP OP CAR RHAB W/ECG: CPT

## 2019-03-11 ENCOUNTER — TELEPHONE (OUTPATIENT)
Dept: CARDIOLOGY | Facility: CLINIC | Age: 69
End: 2019-03-11

## 2019-03-11 ENCOUNTER — TREATMENT (OUTPATIENT)
Dept: CARDIAC REHAB | Facility: HOSPITAL | Age: 69
End: 2019-03-11

## 2019-03-11 ENCOUNTER — APPOINTMENT (OUTPATIENT)
Dept: CARDIAC REHAB | Facility: HOSPITAL | Age: 69
End: 2019-03-11

## 2019-03-11 DIAGNOSIS — I21.4 NSTEMI (NON-ST ELEVATED MYOCARDIAL INFARCTION) (HCC): Primary | ICD-10-CM

## 2019-03-11 PROCEDURE — 93798 PHYS/QHP OP CAR RHAB W/ECG: CPT

## 2019-03-11 NOTE — TELEPHONE ENCOUNTER
----- Message from Lizz Rodriguez MD sent at 3/11/2019  6:46 AM EDT -----  He is ok to go back to work - when do I see him again?

## 2019-03-11 NOTE — TELEPHONE ENCOUNTER
He is scheduled for 3/22/19 @ 1:30pm.  Left VMM on personal cell phone w/results.  Hanna Lozada RN

## 2019-03-13 ENCOUNTER — APPOINTMENT (OUTPATIENT)
Dept: CARDIAC REHAB | Facility: HOSPITAL | Age: 69
End: 2019-03-13

## 2019-03-13 ENCOUNTER — TREATMENT (OUTPATIENT)
Dept: CARDIAC REHAB | Facility: HOSPITAL | Age: 69
End: 2019-03-13

## 2019-03-13 DIAGNOSIS — I21.4 NSTEMI (NON-ST ELEVATED MYOCARDIAL INFARCTION) (HCC): Primary | ICD-10-CM

## 2019-03-13 PROCEDURE — 93798 PHYS/QHP OP CAR RHAB W/ECG: CPT

## 2019-03-15 ENCOUNTER — TREATMENT (OUTPATIENT)
Dept: CARDIAC REHAB | Facility: HOSPITAL | Age: 69
End: 2019-03-15

## 2019-03-15 ENCOUNTER — APPOINTMENT (OUTPATIENT)
Dept: CARDIAC REHAB | Facility: HOSPITAL | Age: 69
End: 2019-03-15

## 2019-03-15 DIAGNOSIS — I21.4 NSTEMI (NON-ST ELEVATED MYOCARDIAL INFARCTION) (HCC): Primary | ICD-10-CM

## 2019-03-15 PROCEDURE — 93798 PHYS/QHP OP CAR RHAB W/ECG: CPT

## 2019-03-18 ENCOUNTER — APPOINTMENT (OUTPATIENT)
Dept: CARDIAC REHAB | Facility: HOSPITAL | Age: 69
End: 2019-03-18

## 2019-03-18 ENCOUNTER — TREATMENT (OUTPATIENT)
Dept: CARDIAC REHAB | Facility: HOSPITAL | Age: 69
End: 2019-03-18

## 2019-03-18 DIAGNOSIS — I21.4 NSTEMI (NON-ST ELEVATED MYOCARDIAL INFARCTION) (HCC): Primary | ICD-10-CM

## 2019-03-18 PROCEDURE — 93798 PHYS/QHP OP CAR RHAB W/ECG: CPT

## 2019-03-20 ENCOUNTER — TREATMENT (OUTPATIENT)
Dept: CARDIAC REHAB | Facility: HOSPITAL | Age: 69
End: 2019-03-20

## 2019-03-20 ENCOUNTER — APPOINTMENT (OUTPATIENT)
Dept: CARDIAC REHAB | Facility: HOSPITAL | Age: 69
End: 2019-03-20

## 2019-03-20 DIAGNOSIS — I21.4 NSTEMI (NON-ST ELEVATED MYOCARDIAL INFARCTION) (HCC): Primary | ICD-10-CM

## 2019-03-20 PROCEDURE — 93798 PHYS/QHP OP CAR RHAB W/ECG: CPT

## 2019-03-22 ENCOUNTER — APPOINTMENT (OUTPATIENT)
Dept: CARDIAC REHAB | Facility: HOSPITAL | Age: 69
End: 2019-03-22

## 2019-03-22 ENCOUNTER — OFFICE VISIT (OUTPATIENT)
Dept: CARDIOLOGY | Facility: CLINIC | Age: 69
End: 2019-03-22

## 2019-03-22 ENCOUNTER — TREATMENT (OUTPATIENT)
Dept: CARDIAC REHAB | Facility: HOSPITAL | Age: 69
End: 2019-03-22

## 2019-03-22 VITALS
HEIGHT: 69 IN | BODY MASS INDEX: 25.77 KG/M2 | DIASTOLIC BLOOD PRESSURE: 82 MMHG | SYSTOLIC BLOOD PRESSURE: 160 MMHG | WEIGHT: 174 LBS

## 2019-03-22 DIAGNOSIS — I21.4 NSTEMI (NON-ST ELEVATED MYOCARDIAL INFARCTION) (HCC): Primary | ICD-10-CM

## 2019-03-22 DIAGNOSIS — I10 ESSENTIAL HYPERTENSION: ICD-10-CM

## 2019-03-22 DIAGNOSIS — E78.2 MIXED HYPERLIPIDEMIA: ICD-10-CM

## 2019-03-22 PROCEDURE — 99214 OFFICE O/P EST MOD 30 MIN: CPT | Performed by: INTERNAL MEDICINE

## 2019-03-22 PROCEDURE — 93000 ELECTROCARDIOGRAM COMPLETE: CPT | Performed by: INTERNAL MEDICINE

## 2019-03-22 PROCEDURE — 93798 PHYS/QHP OP CAR RHAB W/ECG: CPT

## 2019-03-22 RX ORDER — ASPIRIN 81 MG/1
81 TABLET ORAL DAILY
COMMUNITY
End: 2019-11-18

## 2019-03-22 NOTE — PROGRESS NOTES
Date of Office Visit: 2019  Encounter Provider: Lizz Rodriguez MD  Place of Service: [facility] CARDIOLOGY  Patient Name: Justo Castellano  :1950      Patient ID:  Justo Castellano is a 69 y.o. male is here for  followup for CAD.         History of Present Illness      He presented to the [facility] ER on 19 with chest burning and had an unremarkable EKG but a troponin of 0.231.   He was transferred to [facility] where he had a cardiac catheterization for NSTEMI.   He underwent stenting of OM2 receiving 2.5 x 28 mm Xience ABDULKADIR stent.  This caused 90% stenosis in the circumflex which was then opened with kissing balloon technique into circumflex and OM 2.    Echo done 3/4/19 showed ejection fraction 60-65% with grade 1 diastolic dysfunction and mild mitral insufficiency.  He had a treadmill stress study exercising on a Mike protocol for 8 minutes done 3/4/19.  This showed no acute ST changes.  PVCs were noted with exercise.  Patient was hypertensive with exercise.    He has hypertension and hyperlipidemia.     He is doing cardiac rehabilitation.  There, his blood pressure is somewhat elevated.  Rhythm strips have shown PACs and PVCs.    He has a lot of anxiety.  His blood pressure is elevated here today but has not generally been elevated.  He is getting hot flashes with taking atorvastatin at night.  He thinks he might do better taking it with food.  He had no tachycardia, dizziness or syncope.  He has no exertional chest tightness or pressure.  He has had no exertional dyspnea and orthopnea.        Past Medical History:   Diagnosis Date   • Essential hypertension 3/22/2019   • Mixed hyperlipidemia 3/22/2019         Past Surgical History:   Procedure Laterality Date   • CARDIAC CATHETERIZATION N/A 2019    Procedure: Left Heart Cath;  Surgeon: Emmanuelle Good MD;  Location: [facility] CATH INVASIVE LOCATION;  Service: Cardiovascular   •  CARDIAC CATHETERIZATION N/A 2/8/2019    Procedure: Coronary angiography;  Surgeon: Emmanuelle Good MD;  Location:  [facility] CATH INVASIVE LOCATION;  Service: Cardiovascular   • CARDIAC CATHETERIZATION N/A 2/8/2019    Procedure: Left ventriculography;  Surgeon: Emmanuelle Good MD;  Location:  [facility] CATH INVASIVE LOCATION;  Service: Cardiovascular   • CARDIAC CATHETERIZATION N/A 2/8/2019    Procedure: Stent ABDULKADIR coronary;  Surgeon: Emmanuelle Good MD;  Location:  [facility] CATH INVASIVE LOCATION;  Service: Cardiovascular   • HAND SURGERY Right    • INCISION AND DRAINAGE ABSCESS      DR. RIVERA - INSECT BITE - YEARS AGO   • AZ RT/LT HEART CATHETERS N/A 2/8/2019    Procedure: Percutaneous Coronary Intervention;  Surgeon: Emmanuelle Good MD;  Location:  [facility] CATH INVASIVE LOCATION;  Service: Cardiovascular       Current Outpatient Medications on File Prior to Visit   Medication Sig Dispense Refill   • aspirin 81 MG EC tablet Take 81 mg by mouth Daily.     • atorvastatin (LIPITOR) 20 MG tablet Take 1 tablet by mouth Every Night. 90 tablet 3   • clopidogrel (PLAVIX) 75 MG tablet Take 1 tablet by mouth Daily. 90 tablet 3   • cetirizine (zyrTEC) 5 MG tablet Take 5 mg by mouth Daily As Needed for Allergies.     • [DISCONTINUED] aspirin  MG EC tablet Take 1 tablet by mouth Daily. 90 tablet 3     No current facility-administered medications on file prior to visit.        Social History     Socioeconomic History   • Marital status:      Spouse name: Not on file   • Number of children: Not on file   • Years of education: Not on file   • Highest education level: Not on file   Tobacco Use   • Smoking status: Current Every Day Smoker     Packs/day: 0.50     Years: 40.00     Pack years: 20.00     Types: Cigarettes   Substance and Sexual Activity   • Alcohol use: No     Alcohol/week: 0.0 oz     Frequency: Never   • Drug use: No   • Sexual activity: Defer           Review of Systems   Constitution: Negative.   HENT: Negative  "for congestion.    Eyes: Negative for vision loss in left eye and vision loss in right eye.   Respiratory: Negative.  Negative for cough, hemoptysis, shortness of breath, sleep disturbances due to breathing, snoring, sputum production and wheezing.    Endocrine: Negative.    Hematologic/Lymphatic: Negative.    Skin: Negative for poor wound healing and rash.   Musculoskeletal: Negative for falls, gout, muscle cramps and myalgias.   Gastrointestinal: Negative for abdominal pain, diarrhea, dysphagia, hematemesis, melena, nausea and vomiting.   Neurological: Negative for excessive daytime sleepiness, dizziness, headaches, light-headedness, loss of balance, seizures and vertigo.   Psychiatric/Behavioral: Negative for depression and substance abuse. The patient is not nervous/anxious.        Procedures    ECG 12 Lead  Date/Time: 3/22/2019 1:44 PM  Performed by: Lizz Rodriguez MD  Authorized by: Lizz Rodriguez MD   Comparison: compared with previous ECG   Similar to previous ECG  Rhythm: sinus rhythm    Clinical impression: normal ECG                Objective:      Vitals:    03/22/19 1312   BP: 160/82   BP Location: Left arm   Patient Position: Sitting   Weight: 78.9 kg (174 lb)   Height: 175.3 cm (69\")     Body mass index is 25.7 kg/m².    Physical Exam   Constitutional: He is oriented to person, place, and time. He appears well-developed and well-nourished. No distress.   HENT:   Head: Normocephalic and atraumatic.   Eyes: Conjunctivae are normal. No scleral icterus.   Neck: Neck supple. No JVD present. Carotid bruit is not present. No thyromegaly present.   Cardiovascular: Normal rate, regular rhythm, S1 normal, S2 normal, normal heart sounds and intact distal pulses.  No extrasystoles are present. PMI is not displaced. Exam reveals no gallop.   No murmur heard.  Pulses:       Carotid pulses are 2+ on the right side, and 2+ on the left side.       Radial pulses are 2+ on the right side, and 2+ on the left " side.        Dorsalis pedis pulses are 2+ on the right side, and 2+ on the left side.        Posterior tibial pulses are 2+ on the right side, and 2+ on the left side.   Pulmonary/Chest: Effort normal and breath sounds normal. No respiratory distress. He has no wheezes. He has no rhonchi. He has no rales. He exhibits no tenderness.   Abdominal: Soft. Bowel sounds are normal. He exhibits no distension, no abdominal bruit and no mass. There is no tenderness.   Musculoskeletal: He exhibits no edema or deformity.   Lymphadenopathy:     He has no cervical adenopathy.   Neurological: He is alert and oriented to person, place, and time. No cranial nerve deficit.   Skin: Skin is warm and dry. No rash noted. He is not diaphoretic. No cyanosis. No pallor. Nails show no clubbing.   Psychiatric: He has a normal mood and affect. Judgment normal.   Vitals reviewed.      Lab Review:       Assessment:      Diagnosis Plan   1. NSTEMI (non-ST elevated myocardial infarction) (CMS/HCC)     2. Essential hypertension     3. Mixed hyperlipidemia       1. CAD, s/p OM2 stent. No angina.   2. Hyperlipidemia, on atorvastatin.   3. Elevated BP - may need ramipril 2.5mg daily.      Plan:       See jan in 8 weeks.     Coronary Artery Disease  Assessment  • The patient has no angina    Subjective - Objective  • There has been a previous stent procedure using ABDULKADIR  • Current antiplatelet therapy includes aspirin 81 mg and clopidogrel 75 mg

## 2019-03-25 ENCOUNTER — APPOINTMENT (OUTPATIENT)
Dept: CARDIAC REHAB | Facility: HOSPITAL | Age: 69
End: 2019-03-25

## 2019-03-25 ENCOUNTER — TREATMENT (OUTPATIENT)
Dept: CARDIAC REHAB | Facility: HOSPITAL | Age: 69
End: 2019-03-25

## 2019-03-25 ENCOUNTER — TELEPHONE (OUTPATIENT)
Dept: CARDIOLOGY | Facility: CLINIC | Age: 69
End: 2019-03-25

## 2019-03-25 DIAGNOSIS — I21.4 NSTEMI (NON-ST ELEVATED MYOCARDIAL INFARCTION) (HCC): Primary | ICD-10-CM

## 2019-03-25 PROCEDURE — 93798 PHYS/QHP OP CAR RHAB W/ECG: CPT

## 2019-03-25 NOTE — TELEPHONE ENCOUNTER
Pt called and wants to know if he can take the Lipitor 20 mg  every other day because his still having problem when taking it daily.....Pilar DAVE

## 2019-03-27 ENCOUNTER — TREATMENT (OUTPATIENT)
Dept: CARDIAC REHAB | Facility: HOSPITAL | Age: 69
End: 2019-03-27

## 2019-03-27 ENCOUNTER — APPOINTMENT (OUTPATIENT)
Dept: CARDIAC REHAB | Facility: HOSPITAL | Age: 69
End: 2019-03-27

## 2019-03-27 DIAGNOSIS — I21.4 NSTEMI (NON-ST ELEVATED MYOCARDIAL INFARCTION) (HCC): Primary | ICD-10-CM

## 2019-03-27 PROCEDURE — 93798 PHYS/QHP OP CAR RHAB W/ECG: CPT

## 2019-03-29 ENCOUNTER — APPOINTMENT (OUTPATIENT)
Dept: CARDIAC REHAB | Facility: HOSPITAL | Age: 69
End: 2019-03-29

## 2019-03-29 ENCOUNTER — TREATMENT (OUTPATIENT)
Dept: CARDIAC REHAB | Facility: HOSPITAL | Age: 69
End: 2019-03-29

## 2019-03-29 DIAGNOSIS — I21.4 NSTEMI (NON-ST ELEVATED MYOCARDIAL INFARCTION) (HCC): Primary | ICD-10-CM

## 2019-03-29 PROCEDURE — 93798 PHYS/QHP OP CAR RHAB W/ECG: CPT

## 2019-04-01 ENCOUNTER — APPOINTMENT (OUTPATIENT)
Dept: CARDIAC REHAB | Facility: HOSPITAL | Age: 69
End: 2019-04-01

## 2019-04-01 ENCOUNTER — TREATMENT (OUTPATIENT)
Dept: CARDIAC REHAB | Facility: HOSPITAL | Age: 69
End: 2019-04-01

## 2019-04-01 DIAGNOSIS — I21.4 NSTEMI (NON-ST ELEVATED MYOCARDIAL INFARCTION) (HCC): Primary | ICD-10-CM

## 2019-04-01 PROCEDURE — 93798 PHYS/QHP OP CAR RHAB W/ECG: CPT

## 2019-04-03 ENCOUNTER — APPOINTMENT (OUTPATIENT)
Dept: CARDIAC REHAB | Facility: HOSPITAL | Age: 69
End: 2019-04-03

## 2019-04-05 ENCOUNTER — TREATMENT (OUTPATIENT)
Dept: CARDIAC REHAB | Facility: HOSPITAL | Age: 69
End: 2019-04-05

## 2019-04-05 ENCOUNTER — APPOINTMENT (OUTPATIENT)
Dept: CARDIAC REHAB | Facility: HOSPITAL | Age: 69
End: 2019-04-05

## 2019-04-05 DIAGNOSIS — I21.4 NSTEMI (NON-ST ELEVATED MYOCARDIAL INFARCTION) (HCC): Primary | ICD-10-CM

## 2019-04-05 PROCEDURE — 93798 PHYS/QHP OP CAR RHAB W/ECG: CPT

## 2019-04-08 ENCOUNTER — APPOINTMENT (OUTPATIENT)
Dept: CARDIAC REHAB | Facility: HOSPITAL | Age: 69
End: 2019-04-08

## 2019-04-10 ENCOUNTER — APPOINTMENT (OUTPATIENT)
Dept: CARDIAC REHAB | Facility: HOSPITAL | Age: 69
End: 2019-04-10

## 2019-04-12 ENCOUNTER — APPOINTMENT (OUTPATIENT)
Dept: CARDIAC REHAB | Facility: HOSPITAL | Age: 69
End: 2019-04-12

## 2019-04-15 ENCOUNTER — APPOINTMENT (OUTPATIENT)
Dept: CARDIAC REHAB | Facility: HOSPITAL | Age: 69
End: 2019-04-15

## 2019-04-17 ENCOUNTER — APPOINTMENT (OUTPATIENT)
Dept: CARDIAC REHAB | Facility: HOSPITAL | Age: 69
End: 2019-04-17

## 2019-04-19 ENCOUNTER — APPOINTMENT (OUTPATIENT)
Dept: CARDIAC REHAB | Facility: HOSPITAL | Age: 69
End: 2019-04-19

## 2019-04-22 ENCOUNTER — APPOINTMENT (OUTPATIENT)
Dept: CARDIAC REHAB | Facility: HOSPITAL | Age: 69
End: 2019-04-22

## 2019-04-24 ENCOUNTER — APPOINTMENT (OUTPATIENT)
Dept: CARDIAC REHAB | Facility: HOSPITAL | Age: 69
End: 2019-04-24

## 2019-04-24 ENCOUNTER — TELEPHONE (OUTPATIENT)
Dept: CARDIOLOGY | Facility: CLINIC | Age: 69
End: 2019-04-24

## 2019-04-24 NOTE — TELEPHONE ENCOUNTER
Pt called stating that he would like to talk to Dr. Rodriguez in regards to his blood thinner (Plavix)   He feels that this medication could be irritating his esophagus.    He is taking Atorvastatin 5 mg QD and ASA 81 mg every other day.    He would like to know if you think the Plavix could be causing the irritation in esophagus?     Walking relieves the discomfort    PCP advised him to take Zantac. He has taken this twice and it relieved the discomfort. He does not want to take this if the Plavix is causing the irritation.

## 2019-05-15 ENCOUNTER — APPOINTMENT (OUTPATIENT)
Dept: CARDIAC REHAB | Facility: HOSPITAL | Age: 69
End: 2019-05-15

## 2019-05-15 NOTE — PROGRESS NOTES
Subjective:     Encounter Date:05/17/2019      Patient ID: Justo Castellano is a 69 y.o. male.     Chief Complaint:  CAD, HTN  History of Present Illness     He presented to the [facility] ER on 2/8/19 with chest burning and had an unremarkable EKG but a troponin of 0.231.   He was transferred to [facility] where he had a cardiac catheterization for NSTEMI.   He underwent stenting of OM2 receiving 2.5 x 28 mm Xience ABDULKADIR stent.  This caused 90% stenosis in the circumflex which was then opened with kissing balloon technique into circumflex and OM 2.     Echo done 3/4/19 showed ejection fraction 60-65% with grade 1 diastolic dysfunction and mild mitral insufficiency.  He had a treadmill stress study exercising on a Mike protocol for 8 minutes done 3/4/19.  This showed no acute ST changes.  PVCs were noted with exercise.  Patient was hypertensive with exercise.     He has hypertension and hyperlipidemia.      He is doing cardiac rehabilitation.  There, his blood pressure is somewhat elevated.  Rhythm strips have shown PACs and PVCs.      He has a lot of anxiety.  At his last visit, his blood pressure was elevated  but has not generally been elevated.  He is getting hot flashes with taking atorvastatin at night.  He thinks he might do better taking it with food.     He presents today, 5/17/2019, for an 8-week follow-up of his cardiac status.  He denies any palpitations, shortness of breath or edema.  He has had no episodes of chest pain or chest tightness.  He denies any lightheadedness or dizziness.  He is walking 2 miles a day and usually has granddaughter walks with him.  He states he has a fit bit and his heart rate will get up to about 112 and then back down to the 70s and 80s after recovery.  He has been taking his blood pressures at home and states that usually 120/80 and sometimes as low as 112 over the high 70s.    He is having trouble with GERD that he feels is a direct result of taking  the Plavix.  He has been instructed to take ranitidine twice daily.  He is very, very concerned about taking in a medications therefore he is only been taking the ranitidine once a day on most days.  He states this will control it quite a bit but not always.  I have advised him to continue to take it twice a day as originally instructed.  He also is only taking 5 mg of Lipitor at night.    He works in the landscaping field part-time and states one day when his nose was running,  he felt like when he went to blow his nose, he scratched it and said it bled like crazy.  He discussed this with Dr. Guillaume and it sounds like they checked a PT/INR.  He was told that it was 0.4 points higher than what it should be.  Dr. Guillaume decreased his 81 mg of aspirin to every other day.  He has a recent PT/INR of 1.0 from 5/8/2019.     I have asked him to keep a blood pressure log for the next 2 weeks and to call.  His blood pressure is elevated at today's visit, again.  He reports lower BP readings at home.  I discussed he may need a blood pressure medication, but he is so adverse to medicines, I hope he does not need one.  I do not know if he would take it.    The following portions of the patient's history were reviewed and updated as appropriate: allergies, current medications, past family history, past medical history, past social history, past surgical history and problem list.    Review of Systems   Constitution: Negative for weakness and malaise/fatigue.   HENT: Negative for congestion, hoarse voice and sore throat.    Eyes: Negative for blurred vision, double vision, photophobia, vision loss in left eye and vision loss in right eye.   Cardiovascular: Negative for chest pain, dyspnea on exertion, irregular heartbeat, leg swelling, near-syncope, orthopnea, palpitations, paroxysmal nocturnal dyspnea and syncope.   Respiratory: Negative for cough, hemoptysis, shortness of breath, sleep disturbances due to breathing, snoring, sputum  production and wheezing.    Endocrine: Negative.    Hematologic/Lymphatic: Does not bruise/bleed easily.   Skin: Negative for color change, dry skin, poor wound healing and rash.   Musculoskeletal: Negative for back pain, falls, gout, joint pain, joint swelling, muscle cramps and muscle weakness.   Gastrointestinal: Negative for abdominal pain, constipation, diarrhea, dysphagia, melena, nausea and vomiting.   Neurological: Negative for excessive daytime sleepiness, dizziness, headaches, light-headedness, loss of balance, numbness, paresthesias, seizures and vertigo.   Psychiatric/Behavioral: Negative for depression and substance abuse. The patient is not nervous/anxious.      Procedures       Objective:         Physical Exam   Constitutional: He is oriented to person, place, and time. Vital signs are normal. He appears well-developed and well-nourished. No distress.   HENT:   Head: Normocephalic and atraumatic.   Right Ear: Hearing normal.   Left Ear: Hearing normal.   Eyes: Conjunctivae and lids are normal.   Neck: Normal range of motion. Neck supple. No JVD present. Carotid bruit is not present. No thyromegaly present.   Cardiovascular: Normal rate, regular rhythm, S1 normal, S2 normal, normal heart sounds and intact distal pulses.  PMI is not displaced.  Exam reveals no gallop.    No murmur heard.  Pulses:       Carotid pulses are 2+ on the right side, and 2+ on the left side.       Radial pulses are 2+ on the right side, and 2+ on the left side.        Dorsalis pedis pulses are 2+ on the right side, and 2+ on the left side.        Posterior tibial pulses are 2+ on the right side, and 2+ on the left side.   Pulmonary/Chest: Effort normal and breath sounds normal. No respiratory distress. He has no wheezes. He has no rhonchi. He has no rales. He exhibits no tenderness.   Abdominal: Soft. Normal appearance and bowel sounds are normal. He exhibits no distension, no abdominal bruit and no mass. There is no  "tenderness.   Musculoskeletal: Normal range of motion.   Exhibits no edema or deformity   Lymphadenopathy:     He has no cervical adenopathy.   Neurological: He is alert and oriented to person, place, and time. No cranial nerve deficit. Coordination and gait normal.   Oriented to person, place and time.   Skin: Skin is warm, dry and intact. No rash noted. He is not diaphoretic. No cyanosis. Nails show no clubbing.   Psychiatric: He has a normal mood and affect. His speech is normal and behavior is normal. Judgment and thought content normal. Cognition and memory are normal.     Vitals:    05/17/19 0901   BP: 148/82   BP Location: Left arm   Patient Position: Sitting   Cuff Size: Adult   Pulse: 57   SpO2: 97%   Weight: 78 kg (172 lb)   Height: 175.3 cm (69\")           Lab Review:       Assessment:          Diagnosis Plan   1. NSTEMI (non-ST elevated myocardial infarction) (CMS/HCC)     2. Essential hypertension     3. Mixed hyperlipidemia            Plan:       1.  NSTEMI - s/p stent to OM2 2/19.   Denies angina.  Plavix causing GERD symptoms.  Instructed to take the OTC ranitidine BID for better control.  He is only taking 81 ASA QOD and has been advised to start taking it daily.  He needs to continue on DAPT for one full year.  Coronary Artery Disease  Assessment  • The patient has no angina    Plan  • Lifestyle modifications discussed include adhering to a heart healthy diet, avoidance of tobacco products, medication compliance and regular exercise    Subjective - Objective  • There is a history of past MI  • There has been a previous stent procedure using ABDULKADIR  • Current antiplatelet therapy includes aspirin 81 mg and clopidogrel 75 mg      2.  Essential HTN - instructed to keep 2 week BP log of home readings and call.  Will discuss need for blood pressure medication at that time.    3.  Hyperlipidemia - taking atorvastatin 5 mg at HS    Call with BP log in 2 weeks.    RTO 6 months with ANNIE Valdez, " APRN      Current Outpatient Medications:   •  aspirin 81 MG EC tablet, Take 81 mg by mouth Daily., Disp: , Rfl:   •  atorvastatin (LIPITOR) 10 MG tablet, Take 0.5 tablets by mouth Every Night., Disp: 45 tablet, Rfl: 6  •  cetirizine (zyrTEC) 5 MG tablet, Take 5 mg by mouth Daily As Needed for Allergies., Disp: , Rfl:   •  clopidogrel (PLAVIX) 75 MG tablet, Take 1 tablet by mouth Daily., Disp: 90 tablet, Rfl: 3

## 2019-05-17 ENCOUNTER — OFFICE VISIT (OUTPATIENT)
Dept: CARDIOLOGY | Facility: CLINIC | Age: 69
End: 2019-05-17

## 2019-05-17 VITALS
OXYGEN SATURATION: 97 % | BODY MASS INDEX: 25.48 KG/M2 | WEIGHT: 172 LBS | HEIGHT: 69 IN | DIASTOLIC BLOOD PRESSURE: 82 MMHG | HEART RATE: 57 BPM | SYSTOLIC BLOOD PRESSURE: 148 MMHG

## 2019-05-17 DIAGNOSIS — I21.4 NSTEMI (NON-ST ELEVATED MYOCARDIAL INFARCTION) (HCC): Primary | ICD-10-CM

## 2019-05-17 DIAGNOSIS — I10 ESSENTIAL HYPERTENSION: ICD-10-CM

## 2019-05-17 DIAGNOSIS — E78.2 MIXED HYPERLIPIDEMIA: ICD-10-CM

## 2019-05-17 PROCEDURE — 99213 OFFICE O/P EST LOW 20 MIN: CPT | Performed by: NURSE PRACTITIONER

## 2019-05-17 RX ORDER — ATORVASTATIN CALCIUM 10 MG/1
5 TABLET, FILM COATED ORAL NIGHTLY
Qty: 45 TABLET | Refills: 6 | Status: SHIPPED | OUTPATIENT
Start: 2019-05-17 | End: 2020-06-09

## 2019-05-22 ENCOUNTER — APPOINTMENT (OUTPATIENT)
Dept: CARDIAC REHAB | Facility: HOSPITAL | Age: 69
End: 2019-05-22

## 2019-05-29 ENCOUNTER — APPOINTMENT (OUTPATIENT)
Dept: CARDIAC REHAB | Facility: HOSPITAL | Age: 69
End: 2019-05-29

## 2019-05-30 ENCOUNTER — TELEPHONE (OUTPATIENT)
Dept: CARDIOLOGY | Facility: CLINIC | Age: 69
End: 2019-05-30

## 2019-05-30 NOTE — TELEPHONE ENCOUNTER
Pt came in the office. Pt said that Serge wanted him to keep a log of his BP.     5-17 133/85  5-19 126/73  5-21 121/74  5-23 124/74  5-25 126/78  5-27 124/80  5-29 121/70    Also he would like to know if someone would call him. He has some questions about his running.   Phone Number - [phone].

## 2019-06-05 ENCOUNTER — APPOINTMENT (OUTPATIENT)
Dept: CARDIAC REHAB | Facility: HOSPITAL | Age: 69
End: 2019-06-05

## 2019-06-12 ENCOUNTER — APPOINTMENT (OUTPATIENT)
Dept: CARDIAC REHAB | Facility: HOSPITAL | Age: 69
End: 2019-06-12

## 2019-06-18 ENCOUNTER — OFFICE VISIT (OUTPATIENT)
Dept: SURGERY | Facility: CLINIC | Age: 69
End: 2019-06-18

## 2019-06-18 VITALS
WEIGHT: 179 LBS | SYSTOLIC BLOOD PRESSURE: 132 MMHG | DIASTOLIC BLOOD PRESSURE: 78 MMHG | HEIGHT: 69 IN | BODY MASS INDEX: 26.51 KG/M2 | RESPIRATION RATE: 18 BRPM
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
## 2019-06-18 PROCEDURE — 11042 DBRDMT SUBQ TIS 1ST 20SQCM/<: CPT | Performed by: SURGERY

## 2019-06-18 NOTE — PROGRESS NOTES
PATIENT INFORMATION  Justo Castellano       - 1950    CHIEF COMPLAINT  Chief Complaint   Patient presents with   • Insect Bite     right side   insect bite on right right side of abdomen    HISTORY OF PRESENT ILLNESS  HPI he was working in some hay and noticed some pain on his right abdominal wall.  He says he did not actually see an insect but it got red and tender.  He denies any drainage from the site.  He put some nail polish over the site.  Then he tried to use nail polish remover.  Does have a prior history of insect bites in the past.  These required debridement x2        REVIEW OF SYSTEMS  Review of Systems myocardial infarction on aspirin and Plavix.  He does have some mild increased bleeding while on the medication otherwise negative      ACTIVE PROBLEMS  Patient Active Problem List    Diagnosis   • Essential hypertension [I10]   • Mixed hyperlipidemia [E78.2]   • NSTEMI (non-ST elevated myocardial infarction) (CMS/[facility]) [I21.4]         PAST MEDICAL HISTORY  Past Medical History:   Diagnosis Date   • Essential hypertension 3/22/2019   • Mixed hyperlipidemia 3/22/2019         SURGICAL HISTORY  Past Surgical History:   Procedure Laterality Date   • CARDIAC CATHETERIZATION N/A 2019    Procedure: Left Heart Cath;  Surgeon: Emmanuelle Good MD;  Location: [facility] CATH INVASIVE LOCATION;  Service: Cardiovascular   • CARDIAC CATHETERIZATION N/A 2019    Procedure: Coronary angiography;  Surgeon: Emmanuelle Good MD;  [facility];  Service: Cardiovascular   • CARDIAC CATHETERIZATION N/A 2019    Procedure: Left ventriculography;  Surgeon: Emmanuelle Good MD;  Location: [facility] CATH INVASIVE LOCATION;  Service: Cardiovascular   • CARDIAC CATHETERIZATION N/A 2019    Procedure: Stent ABDULKADIR coronary;  Surgeon: Emmanuelle Good MD;  Location: [facility] CATH INVASIVE LOCATION;  Service: Cardiovascular   • HAND SURGERY Right    • INCISION AND DRAINAGE ABSCESS        "RIVERA - INSECT BITE - YEARS AGO   • [place] RT/LT HEART CATHETERS N/A 2/8/2019    Procedure: Percutaneous Coronary Intervention;  Surgeon: Emmanuelle Good MD;  Location: [facility] INVASIVE LOCATION;  Service: Cardiovascular         FAMILY HISTORY  Family History   Problem Relation Age of Onset   • Diabetes Sister          SOCIAL HISTORY  Social History     Occupational History   • Not on file   Tobacco Use   • Smoking status: Former Smoker     Packs/day: 0.50     Years: 40.00     Pack years: 20.00     Types: Cigarettes   • Smokeless tobacco: Never Used   Substance and Sexual Activity   • Alcohol use: No     Alcohol/week: 0.0 oz     Frequency: Never   • Drug use: No   • Sexual activity: Defer       Debilities/Disabilities Identified: None    Emotional Behavior: Appropriate    CURRENT MEDICATIONS    Current Outpatient Medications:   •  aspirin 81 MG EC tablet, Take 81 mg by mouth Daily., Disp: , Rfl:   •  atorvastatin (LIPITOR) 10 MG tablet, Take 0.5 tablets by mouth Every Night., Disp: 45 tablet, Rfl: 6  •  clopidogrel (PLAVIX) 75 MG tablet, Take 1 tablet by mouth Daily., Disp: 90 tablet, Rfl: 3    ALLERGIES  Patient has no known allergies.    VITALS  Vitals:    06/18/19 0853   BP: 132/78   Resp: 18   Weight: 81.2 kg (179 lb)   Height: 175.3 cm (69\")       LAST RESULTS   Hospital Outpatient Visit on 03/04/2019   Component Date Value Ref Range Status   •  CV STRESS PROTOCOL 1 03/04/2019 Mike   Final   • Stage 1 03/04/2019 1   Final   • Duration Min Stage 1 03/04/2019 3   Final   • Duration Sec Stage 1 03/04/2019 0   Final   • Grade Stage 1 03/04/2019 10   Final   • Speed Stage 1 03/04/2019 1.7   Final   •  CV STRESS METS STAGE 1 03/04/2019 5   Final   • Target HR (85%) 03/04/2019 130  bpm Final   • Max. Pred. HR (100%) 03/04/2019 153  bpm Final   • Baseline HR 03/04/2019 66  bpm Final   • Baseline BP 03/04/2019 157/82  mmHg Final   • O2 sat rest 03/04/2019 99  % Final   • HR Stage 1 03/04/2019 90   Final   • BP " Stage 1 03/04/2019 168/79   Final   • Stage 2 03/04/2019 2   Final   • HR Stage 2 03/04/2019 108   Final   • BP Stage 2 03/04/2019 177/78   Final   • Duration Min Stage 2 03/04/2019 3   Final   • Duration Sec Stage 2 03/04/2019 0   Final   • Grade Stage 2 03/04/2019 12   Final   • Speed Stage 2 03/04/2019 2.5   Final   • BH CV STRESS METS STAGE 2 03/04/2019 7.5   Final   • Stage 3 03/04/2019 3   Final   • HR Stage 3 03/04/2019 247   Final   • BP Stage 3 03/04/2019 202/76   Final   • Duration Min Stage 3 03/04/2019 2   Final   • Duration Sec Stage 3 03/04/2019 47   Final   • Grade Stage 3 03/04/2019 14   Final   • Speed Stage 3 03/04/2019 3.4   Final   • BH CV STRESS METS STAGE 3 03/04/2019 10.0   Final   • Peak HR 03/04/2019 133  bpm Final   • Percent Max Pred HR 03/04/2019 86.93  % Final   • Percent Target HR 03/04/2019 102  % Final   • Peak BP 03/04/2019 202/173  mmHg Final   • O2 sat peak 03/04/2019 99  % Final   • Recovery HR 03/04/2019 79  bpm Final   • Recovery BP 03/04/2019 184/96  mmHg Final   • Exercise duration (min) 03/04/2019 8  min Final   • Exercise duration (sec) 03/04/2019 48  sec Final   • Estimated workload 03/04/2019 10.2  METS Final     No results found.    PHYSICAL EXAM  Physical Exam alert white male in no active distress.  Right abdomen he has a 2 cm area of induration but no abscess.  Centrally there is an 8 x 1.2 cm area of eschar consistent with full-thickness skin loss.    ASSESSMENT  Insect bite      PLAN  The risk benefits and options were discussed with the patient in detail.  The site was prepped draped locally infiltrated with 1% lidocaine with epinephrine skin and subcutaneous tissue was excised and debrided 8 mm.  We debrided until healthy subcutaneous tissue.  The wound was waked with iodoform gauze that he should remove tomorrow.  He should start twice daily showers at that time.  I wrote him a prescription for Cleocin 150 mg p.o. 3 times daily 21 were dispensed without refills.   I will see him back in 1 week

## 2019-06-19 ENCOUNTER — APPOINTMENT (OUTPATIENT)
Dept: CARDIAC REHAB | Facility: HOSPITAL | Age: 69
End: 2019-06-19

## 2019-06-21 ENCOUNTER — OFFICE VISIT (OUTPATIENT)
Dept: SURGERY | Facility: CLINIC | Age: 69
End: 2019-06-21

## 2019-06-21 DIAGNOSIS — Z09 SURGICAL FOLLOW-UP CARE: Primary | ICD-10-CM

## 2019-06-21 PROCEDURE — 99212 OFFICE O/P EST SF 10 MIN: CPT | Performed by: SURGERY

## 2019-06-21 RX ORDER — CLINDAMYCIN HYDROCHLORIDE 150 MG/1
150 CAPSULE ORAL 3 TIMES DAILY
COMMUNITY
Start: 2019-06-18 | End: 2019-06-25

## 2019-06-21 NOTE — PROGRESS NOTES
3 days s/p I/d abd. Wound. Area red and irritated.  He has some concerns.  He is cleaning the wound with peroxide and then getting in the shower then using peroxide again and putting a dry dressing over the site.  He also complains of a rash around the wound the rash is likely secondary to the tape.  The wound is  still has some necrosis at the base.  I told him that he needs to use a peroxide then get in the shower and then apply dry dressing and I utilized paper tape today.  The amount of induration is stable from what it was and I will see him back on Tuesday

## 2019-06-25 ENCOUNTER — OFFICE VISIT (OUTPATIENT)
Dept: SURGERY | Facility: CLINIC | Age: 69
End: 2019-06-25

## 2019-06-25 ENCOUNTER — APPOINTMENT (OUTPATIENT)
Dept: LAB | Facility: HOSPITAL | Age: 69
End: 2019-06-25

## 2019-06-25 DIAGNOSIS — S31.109D OPEN WOUND OF ABDOMINAL WALL, SUBSEQUENT ENCOUNTER: Primary | ICD-10-CM

## 2019-06-25 PROBLEM — S31.109A OPEN ABDOMINAL WALL WOUND: Status: ACTIVE | Noted: 2019-06-25

## 2019-06-25 LAB
ANION GAP SERPL CALCULATED.3IONS-SCNC: 9.5 MMOL/L
BUN BLD-MCNC: 17 MG/DL (ref 8–23)
BUN/CREAT SERPL: 18.7 (ref 7–25)
CALCIUM SPEC-SCNC: 9.1 MG/DL (ref 8.6–10.5)
CHLORIDE SERPL-SCNC: 103 MMOL/L (ref 98–107)
CO2 SERPL-SCNC: 26.5 MMOL/L (ref 22–29)
CREAT BLD-MCNC: 0.91 MG/DL (ref 0.76–1.27)
DEPRECATED RDW RBC AUTO: 42.8 FL (ref 37–54)
ERYTHROCYTE [DISTWIDTH] IN BLOOD BY AUTOMATED COUNT: 12.4 % (ref 12.3–15.4)
GFR SERPL CREATININE-BSD FRML MDRD: 83 ML/MIN/1.73
GLUCOSE BLD-MCNC: 94 MG/DL (ref 65–99)
HCT VFR BLD AUTO: 43.9 % (ref 37.5–51)
HGB BLD-MCNC: 14.7 G/DL (ref 13–17.7)
MCH RBC QN AUTO: 31.5 PG (ref 26.6–33)
MCHC RBC AUTO-ENTMCNC: 33.5 G/DL (ref 31.5–35.7)
MCV RBC AUTO: 94.2 FL (ref 79–97)
PLATELET # BLD AUTO: 239 10*3/MM3 (ref 140–450)
PMV BLD AUTO: 9.2 FL (ref 6–12)
POTASSIUM BLD-SCNC: 4.6 MMOL/L (ref 3.5–5.2)
RBC # BLD AUTO: 4.66 10*6/MM3 (ref 4.14–5.8)
SODIUM BLD-SCNC: 139 MMOL/L (ref 136–145)
WBC NRBC COR # BLD: 7.05 10*3/MM3 (ref 3.4–10.8)

## 2019-06-25 PROCEDURE — 85027 COMPLETE CBC AUTOMATED: CPT | Performed by: SURGERY

## 2019-06-25 PROCEDURE — 80048 BASIC METABOLIC PNL TOTAL CA: CPT | Performed by: SURGERY

## 2019-06-25 PROCEDURE — 36415 COLL VENOUS BLD VENIPUNCTURE: CPT | Performed by: SURGERY

## 2019-06-25 PROCEDURE — 99212 OFFICE O/P EST SF 10 MIN: CPT | Performed by: SURGERY

## 2019-06-25 RX ORDER — MULTIPLE VITAMINS W/ MINERALS TAB 9MG-400MCG
1 TAB ORAL DAILY
COMMUNITY

## 2019-06-25 RX ORDER — RANITIDINE 150 MG/1
150 TABLET ORAL 2 TIMES DAILY
COMMUNITY
End: 2019-12-23

## 2019-06-25 NOTE — PROGRESS NOTES
PATIENT INFORMATION  Justo Castellano       - 1950    CHIEF COMPLAINT  Chief Complaint   Patient presents with   • Post-op   1 wk PO I/D 19 - states everything seems to be going good, still having some drainage    HISTORY OF PRESENT ILLNESS  HPI he says he thinks the wound is improved.  He has been cleaning with hydrogen peroxide and a daily shower denies any bleeding from the site he denies any fevers or chills.        REVIEW OF SYSTEMS  Review of Systems he is a myocardial infarction in February of this year.  He had a stent placed he subsequently had an echocardiogram  Otherwise negative      ACTIVE PROBLEMS  Patient Active Problem List    Diagnosis   • Essential hypertension [I10]   • Mixed hyperlipidemia [E78.2]   • NSTEMI (non-ST elevated myocardial infarction) (CMS/[facility]) [I21.4]         PAST MEDICAL HISTORY  Past Medical History:   Diagnosis Date   • Essential hypertension 3/22/2019   • Mixed hyperlipidemia 3/22/2019         SURGICAL HISTORY  Past Surgical History:   Procedure Laterality Date   • CARDIAC CATHETERIZATION N/A 2019    Procedure: Left Heart Cath;  Surgeon: Emmanuelle Good MD;  [facility];  Service: Cardiovascular   • CARDIAC CATHETERIZATION N/A 2019    Procedure: Coronary angiography;  Surgeon: Emmanuelle Good MD;  Location:  [facility] CATH INVASIVE LOCATION;  Service: Cardiovascular   • CARDIAC CATHETERIZATION N/A 2019    Procedure: Left ventriculography;  Surgeon: Emmanuelle Good MD;  Location:  [facility] CATH INVASIVE LOCATION;  Service: Cardiovascular   • CARDIAC CATHETERIZATION N/A 2019    Procedure: Stent ABDULKADIR coronary;  Surgeon: Emmanuelle Good MD;  Location:  [facility] CATH INVASIVE LOCATION;  Service: Cardiovascular   • HAND SURGERY Right    • INCISION AND DRAINAGE ABSCESS      DR. RIVERA - INSECT BITE - YEARS AGO   • [place] RT/LT HEART CATHETERS N/A 2019    Procedure: Percutaneous Coronary Intervention;  Surgeon: Emmanuelle Good MD;   Location: [facility] LOC[facility];  Service: Cardiovascular         FAMILY HISTORY  Family History   Problem Relation Age of Onset   • Diabetes Sister          SOCIAL HISTORY  Social History     Occupational History   • Not on file   Tobacco Use   • Smoking status: Former Smoker     Packs/day: 0.50     Years: 40.00     Pack years: 20.00     Types: Cigarettes   • Smokeless tobacco: Never Used   Substance and Sexual Activity   • Alcohol use: No     Alcohol/week: 0.0 oz     Frequency: Never   • Drug use: No   • Sexual activity: Defer       Debilities/Disabilities Identified: None    Emotional Behavior: Anxious    CURRENT MEDICATIONS    Current Outpatient Medications:   •  aspirin 81 MG EC tablet, Take 81 mg by mouth Daily., Disp: , Rfl:   •  atorvastatin (LIPITOR) 10 MG tablet, Take 0.5 tablets by mouth Every Night., Disp: 45 tablet, Rfl: 6  •  clindamycin (CLEOCIN) 150 MG capsule, Take 150 mg by mouth 3 (Three) Times a Day., Disp: , Rfl:   •  clopidogrel (PLAVIX) 75 MG tablet, Take 1 tablet by mouth Daily., Disp: 90 tablet, Rfl: 3    ALLERGIES  Patient has no known allergies.    VITALS  There were no vitals filed for this visit.    LAST RESULTS   Hospital Outpatient Visit on 03/04/2019   Component Date Value Ref Range Status   •  CV STRESS PROTOCOL 1 03/04/2019 Mike   Final   • Stage 1 03/04/2019 1   Final   • Duration Min Stage 1 03/04/2019 3   Final   • Duration Sec Stage 1 03/04/2019 0   Final   • Grade Stage 1 03/04/2019 10   Final   • Speed Stage 1 03/04/2019 1.7   Final   •  CV STRESS METS STAGE 1 03/04/2019 5   Final   • Target HR (85%) 03/04/2019 130  bpm Final   • Max. Pred. HR (100%) 03/04/2019 153  bpm Final   • Baseline HR 03/04/2019 66  bpm Final   • Baseline BP 03/04/2019 157/82  mmHg Final   • O2 sat rest 03/04/2019 99  % Final   • HR Stage 1 03/04/2019 90   Final   • BP Stage 1 03/04/2019 168/79   Final   • Stage 2 03/04/2019 2   Final   • HR Stage 2 03/04/2019 108   Final   • BP Stage 2  03/04/2019 177/78   Final   • Duration Min Stage 2 03/04/2019 3   Final   • Duration Sec Stage 2 03/04/2019 0   Final   • Grade Stage 2 03/04/2019 12   Final   • Speed Stage 2 03/04/2019 2.5   Final   • BH CV STRESS METS STAGE 2 03/04/2019 7.5   Final   • Stage 3 03/04/2019 3   Final   • HR Stage 3 03/04/2019 247   Final   • BP Stage 3 03/04/2019 202/76   Final   • Duration Min Stage 3 03/04/2019 2   Final   • Duration Sec Stage 3 03/04/2019 47   Final   • Grade Stage 3 03/04/2019 14   Final   • Speed Stage 3 03/04/2019 3.4   Final   • BH CV STRESS METS STAGE 3 03/04/2019 10.0   Final   • Peak HR 03/04/2019 133  bpm Final   • Percent Max Pred HR 03/04/2019 86.93  % Final   • Percent Target HR 03/04/2019 102  % Final   • Peak BP 03/04/2019 202/173  mmHg Final   • O2 sat peak 03/04/2019 99  % Final   • Recovery HR 03/04/2019 79  bpm Final   • Recovery BP 03/04/2019 184/96  mmHg Final   • Exercise duration (min) 03/04/2019 8  min Final   • Exercise duration (sec) 03/04/2019 48  sec Final   • Estimated workload 03/04/2019 10.2  METS Final     No results found.    PHYSICAL EXAM  Physical Exam no active distress.  Is oriented x3.  His lungs are clear and equal his heart shows a regular rate and rhythm.  His right lower quadrant abdominal wound is worse.  There is increased skin and subcutaneous necrosis.  There is induration around the site but I do not appreciate an abscess    ASSESSMENT  Open wound      PLAN  The risks benefits and options were discussed with the patient in detail.  I also discussed the case with his cardiologist Dr. Rodriguez  We will proceed with operative excisional debridement in the OR tomorrow.  We will continue his Plavix  He understands the risks and wishes to proceed

## 2019-06-25 NOTE — H&P
PATIENT INFORMATION  Justo Castellano       - 1950    CHIEF COMPLAINT  Chief Complaint   Patient presents with   • Post-op   1 wk PO I/D 19 - states everything seems to be going good, still having some drainage    HISTORY OF PRESENT ILLNESS  HPI he says he thinks the wound is improved.  He has been cleaning with hydrogen peroxide and a daily shower denies any bleeding from the site he denies any fevers or chills.        REVIEW OF SYSTEMS  Review of Systems he is a myocardial infarction in February of this year.  He had a stent placed he subsequently had an echocardiogram  Otherwise negative      ACTIVE PROBLEMS  Patient Active Problem List    Diagnosis   • Essential hypertension [I10]   • Mixed hyperlipidemia [E78.2]   • NSTEMI (non-ST elevated myocardial infarction) (CMS/formerly [facility]) [I21.4]         PAST MEDICAL HISTORY  Past Medical History:   Diagnosis Date   • Essential hypertension 3/22/2019   • Mixed hyperlipidemia 3/22/2019         SURGICAL HISTORY  Past Surgical History:   Procedure Laterality Date   • CARDIAC CATHETERIZATION N/A 2019    Procedure: Left Heart Cath;  Surgeon: Emmanuelle Good MD;  Location[facility];  Service: Cardiovascular   • CARDIAC CATHETERIZATION N/A 2019    Procedure: Coronary angiography;  Surgeon: Emmanuelle Good MD;  Location:  [facility] CATH INVASIVE LOCATION;  Service: Cardiovascular   • CARDIAC CATHETERIZATION N/A 2019    Procedure: Left ventriculography;  Surgeon: Emmanuelle Good MD;  Location:  [facility] CATH INVASIVE LOCATION;  Service: Cardiovascular   • CARDIAC CATHETERIZATION N/A 2019    Procedure: Stent ABDULKADIR coronary;  Surgeon: Emmanuelle Good MD;  Location:  [facility] CATH INVASIVE LOCATION;  Service: Cardiovascular   • HAND SURGERY Right    • INCISION AND DRAINAGE ABSCESS      DR. RIVERA - INSECT BITE - YEARS AGO   • MT RT/LT HEART CATHETERS N/A 2019    Procedure: Percutaneous Coronary Intervention;  Surgeon: Emmanuelle Good MD;   Location: [facility] LOCATION;  Service: Cardiovascular         FAMILY HISTORY  Family History   Problem Relation Age of Onset   • Diabetes Sister          SOCIAL HISTORY  Social History     Occupational History   • Not on file   Tobacco Use   • Smoking status: Former Smoker     Packs/day: 0.50     Years: 40.00     Pack years: 20.00     Types: Cigarettes   • Smokeless tobacco: Never Used   Substance and Sexual Activity   • Alcohol use: No     Alcohol/week: 0.0 oz     Frequency: Never   • Drug use: No   • Sexual activity: Defer       Debilities/Disabilities Identified: None    Emotional Behavior: Anxious    CURRENT MEDICATIONS    Current Outpatient Medications:   •  aspirin 81 MG EC tablet, Take 81 mg by mouth Daily., Disp: , Rfl:   •  atorvastatin (LIPITOR) 10 MG tablet, Take 0.5 tablets by mouth Every Night., Disp: 45 tablet, Rfl: 6  •  clindamycin (CLEOCIN) 150 MG capsule, Take 150 mg by mouth 3 (Three) Times a Day., Disp: , Rfl:   •  clopidogrel (PLAVIX) 75 MG tablet, Take 1 tablet by mouth Daily., Disp: 90 tablet, Rfl: 3    ALLERGIES  Patient has no known allergies.    VITALS  There were no vitals filed for this visit.    LAST RESULTS   Hospital Outpatient Visit on 03/04/2019   Component Date Value Ref Range Status   •  CV STRESS PROTOCOL 1 03/04/2019 Mike   Final   • Stage 1 03/04/2019 1   Final   • Duration Min Stage 1 03/04/2019 3   Final   • Duration Sec Stage 1 03/04/2019 0   Final   • Grade Stage 1 03/04/2019 10   Final   • Speed Stage 1 03/04/2019 1.7   Final   •  CV STRESS METS STAGE 1 03/04/2019 5   Final   • Target HR (85%) 03/04/2019 130  bpm Final   • Max. Pred. HR (100%) 03/04/2019 153  bpm Final   • Baseline HR 03/04/2019 66  bpm Final   • Baseline BP 03/04/2019 157/82  mmHg Final   • O2 sat rest 03/04/2019 99  % Final   • HR Stage 1 03/04/2019 90   Final   • BP Stage 1 03/04/2019 168/79   Final   • Stage 2 03/04/2019 2   Final   • HR Stage 2 03/04/2019 108   Final   • BP Stage 2  03/04/2019 177/78   Final   • Duration Min Stage 2 03/04/2019 3   Final   • Duration Sec Stage 2 03/04/2019 0   Final   • Grade Stage 2 03/04/2019 12   Final   • Speed Stage 2 03/04/2019 2.5   Final   • BH CV STRESS METS STAGE 2 03/04/2019 7.5   Final   • Stage 3 03/04/2019 3   Final   • HR Stage 3 03/04/2019 247   Final   • BP Stage 3 03/04/2019 202/76   Final   • Duration Min Stage 3 03/04/2019 2   Final   • Duration Sec Stage 3 03/04/2019 47   Final   • Grade Stage 3 03/04/2019 14   Final   • Speed Stage 3 03/04/2019 3.4   Final   • BH CV STRESS METS STAGE 3 03/04/2019 10.0   Final   • Peak HR 03/04/2019 133  bpm Final   • Percent Max Pred HR 03/04/2019 86.93  % Final   • Percent Target HR 03/04/2019 102  % Final   • Peak BP 03/04/2019 202/173  mmHg Final   • O2 sat peak 03/04/2019 99  % Final   • Recovery HR 03/04/2019 79  bpm Final   • Recovery BP 03/04/2019 184/96  mmHg Final   • Exercise duration (min) 03/04/2019 8  min Final   • Exercise duration (sec) 03/04/2019 48  sec Final   • Estimated workload 03/04/2019 10.2  METS Final     No results found.    PHYSICAL EXAM  Physical Exam no active distress.  Is oriented x3.  His lungs are clear and equal his heart shows a regular rate and rhythm.  His right lower quadrant abdominal wound is worse.  There is increased skin and subcutaneous necrosis.  There is induration around the site but I do not appreciate an abscess    ASSESSMENT  Open wound      PLAN  The risks benefits and options were discussed with the patient in detail.  I also discussed the case with his cardiologist Dr. Rodriguez  We will proceed with operative excisional debridement in the OR tomorrow.  We will continue his Plavix  He understands the risks and wishes to proceed

## 2019-06-26 ENCOUNTER — APPOINTMENT (OUTPATIENT)
Dept: CARDIAC REHAB | Facility: HOSPITAL | Age: 69
End: 2019-06-26

## 2019-06-26 ENCOUNTER — ANESTHESIA EVENT (OUTPATIENT)
Dept: PERIOP | Facility: HOSPITAL | Age: 69
End: 2019-06-26

## 2019-06-26 ENCOUNTER — HOSPITAL ENCOUNTER (OUTPATIENT)
Facility: HOSPITAL | Age: 69
Setting detail: HOSPITAL OUTPATIENT SURGERY
Discharge: HOME | End: 2019-06-26
Attending: SURGERY | Admitting: SURGERY

## 2019-06-26 ENCOUNTER — ANESTHESIA (OUTPATIENT)
Dept: PERIOP | Facility: HOSPITAL | Age: 69
End: 2019-06-26

## 2019-06-26 VITALS
DIASTOLIC BLOOD PRESSURE: 87 MMHG | SYSTOLIC BLOOD PRESSURE: 144 MMHG | BODY MASS INDEX: 25.24 KG/M2 | HEIGHT: 69 IN | WEIGHT: 170.4 LBS | TEMPERATURE: 97.6 F | HEART RATE: 70 BPM | RESPIRATION RATE: 22 BRPM | OXYGEN SATURATION: 93 %

## 2019-06-26 DIAGNOSIS — S31.109D OPEN WOUND OF ABDOMINAL WALL, SUBSEQUENT ENCOUNTER: ICD-10-CM

## 2019-06-26 PROCEDURE — 11042 DBRDMT SUBQ TIS 1ST 20SQCM/<: CPT | Performed by: SURGERY

## 2019-06-26 PROCEDURE — 25010000002 MIDAZOLAM PER 1 MG: Performed by: NURSE ANESTHETIST, CERTIFIED REGISTERED

## 2019-06-26 PROCEDURE — 88304 TISSUE EXAM BY PATHOLOGIST: CPT | Performed by: SURGERY

## 2019-06-26 PROCEDURE — 25010000002 ONDANSETRON PER 1 MG: Performed by: NURSE ANESTHETIST, CERTIFIED REGISTERED

## 2019-06-26 PROCEDURE — 25010000002 DEXAMETHASONE PER 1 MG: Performed by: NURSE ANESTHETIST, CERTIFIED REGISTERED

## 2019-06-26 RX ORDER — ACETAMINOPHEN 500 MG
1000 TABLET ORAL ONCE
Status: DISCONTINUED | OUTPATIENT
Start: 2019-06-26 | End: 2019-06-26 | Stop reason: HOSPADM

## 2019-06-26 RX ORDER — CLINDAMYCIN HYDROCHLORIDE 150 MG/1
150 CAPSULE ORAL 3 TIMES DAILY
Qty: 15 CAPSULE | Refills: 0 | Status: SHIPPED | OUTPATIENT
Start: 2019-06-26 | End: 2019-07-01

## 2019-06-26 RX ORDER — LIDOCAINE HYDROCHLORIDE 10 MG/ML
0.5 INJECTION, SOLUTION EPIDURAL; INFILTRATION; INTRACAUDAL; PERINEURAL ONCE AS NEEDED
Status: COMPLETED | OUTPATIENT
Start: 2019-06-26 | End: 2019-06-26

## 2019-06-26 RX ORDER — LIDOCAINE HYDROCHLORIDE AND EPINEPHRINE 10; 10 MG/ML; UG/ML
INJECTION, SOLUTION INFILTRATION; PERINEURAL AS NEEDED
Status: DISCONTINUED | OUTPATIENT
Start: 2019-06-26 | End: 2019-06-26 | Stop reason: HOSPADM

## 2019-06-26 RX ORDER — SODIUM CHLORIDE 0.9 % (FLUSH) 0.9 %
1-10 SYRINGE (ML) INJECTION AS NEEDED
Status: DISCONTINUED | OUTPATIENT
Start: 2019-06-26 | End: 2019-06-26 | Stop reason: HOSPADM

## 2019-06-26 RX ORDER — MIDAZOLAM HYDROCHLORIDE 1 MG/ML
INJECTION INTRAMUSCULAR; INTRAVENOUS AS NEEDED
Status: DISCONTINUED | OUTPATIENT
Start: 2019-06-26 | End: 2019-06-26 | Stop reason: SURG

## 2019-06-26 RX ORDER — OXYCODONE HYDROCHLORIDE AND ACETAMINOPHEN 5; 325 MG/1; MG/1
1 TABLET ORAL ONCE AS NEEDED
Status: DISCONTINUED | OUTPATIENT
Start: 2019-06-26 | End: 2019-06-26 | Stop reason: HOSPADM

## 2019-06-26 RX ORDER — CLINDAMYCIN PHOSPHATE 900 MG/50ML
900 INJECTION INTRAVENOUS ONCE
Status: COMPLETED | OUTPATIENT
Start: 2019-06-26 | End: 2019-06-26

## 2019-06-26 RX ORDER — MAGNESIUM HYDROXIDE 1200 MG/15ML
LIQUID ORAL AS NEEDED
Status: DISCONTINUED | OUTPATIENT
Start: 2019-06-26 | End: 2019-06-26 | Stop reason: HOSPADM

## 2019-06-26 RX ORDER — DEXAMETHASONE SODIUM PHOSPHATE 4 MG/ML
8 INJECTION, SOLUTION INTRA-ARTICULAR; INTRALESIONAL; INTRAMUSCULAR; INTRAVENOUS; SOFT TISSUE ONCE AS NEEDED
Status: COMPLETED | OUTPATIENT
Start: 2019-06-26 | End: 2019-06-26

## 2019-06-26 RX ORDER — MEPERIDINE HYDROCHLORIDE 25 MG/ML
12.5 INJECTION INTRAMUSCULAR; INTRAVENOUS; SUBCUTANEOUS
Status: DISCONTINUED | OUTPATIENT
Start: 2019-06-26 | End: 2019-06-26 | Stop reason: HOSPADM

## 2019-06-26 RX ORDER — ONDANSETRON 2 MG/ML
4 INJECTION INTRAMUSCULAR; INTRAVENOUS ONCE AS NEEDED
Status: DISCONTINUED | OUTPATIENT
Start: 2019-06-26 | End: 2019-06-26 | Stop reason: HOSPADM

## 2019-06-26 RX ORDER — ONDANSETRON 2 MG/ML
4 INJECTION INTRAMUSCULAR; INTRAVENOUS ONCE AS NEEDED
Status: COMPLETED | OUTPATIENT
Start: 2019-06-26 | End: 2019-06-26

## 2019-06-26 RX ORDER — SODIUM CHLORIDE, SODIUM LACTATE, POTASSIUM CHLORIDE, CALCIUM CHLORIDE 600; 310; 30; 20 MG/100ML; MG/100ML; MG/100ML; MG/100ML
100 INJECTION, SOLUTION INTRAVENOUS CONTINUOUS
Status: DISCONTINUED | OUTPATIENT
Start: 2019-06-26 | End: 2019-06-26 | Stop reason: HOSPADM

## 2019-06-26 RX ORDER — SODIUM CHLORIDE, SODIUM LACTATE, POTASSIUM CHLORIDE, CALCIUM CHLORIDE 600; 310; 30; 20 MG/100ML; MG/100ML; MG/100ML; MG/100ML
9 INJECTION, SOLUTION INTRAVENOUS CONTINUOUS
Status: DISCONTINUED | OUTPATIENT
Start: 2019-06-26 | End: 2019-06-26 | Stop reason: HOSPADM

## 2019-06-26 RX ORDER — KETAMINE HYDROCHLORIDE 10 MG/ML
INJECTION INTRAMUSCULAR; INTRAVENOUS AS NEEDED
Status: DISCONTINUED | OUTPATIENT
Start: 2019-06-26 | End: 2019-06-26 | Stop reason: SURG

## 2019-06-26 RX ADMIN — MIDAZOLAM HYDROCHLORIDE 0.5 MG: 1 INJECTION, SOLUTION INTRAMUSCULAR; INTRAVENOUS at 09:28

## 2019-06-26 RX ADMIN — SODIUM CHLORIDE, POTASSIUM CHLORIDE, SODIUM LACTATE AND CALCIUM CHLORIDE 9 ML/HR: 600; 310; 30; 20 INJECTION, SOLUTION INTRAVENOUS at 08:48

## 2019-06-26 RX ADMIN — MIDAZOLAM HYDROCHLORIDE 1 MG: 1 INJECTION, SOLUTION INTRAMUSCULAR; INTRAVENOUS at 09:24

## 2019-06-26 RX ADMIN — LIDOCAINE HYDROCHLORIDE 0.1 ML: 10 INJECTION, SOLUTION EPIDURAL; INFILTRATION; INTRACAUDAL; PERINEURAL at 08:15

## 2019-06-26 RX ADMIN — KETAMINE HYDROCHLORIDE 30 MG: 10 INJECTION INTRAMUSCULAR; INTRAVENOUS at 09:24

## 2019-06-26 RX ADMIN — ONDANSETRON 4 MG: 2 INJECTION INTRAMUSCULAR; INTRAVENOUS at 08:48

## 2019-06-26 RX ADMIN — CLINDAMYCIN IN 5 PERCENT DEXTROSE 900 MG: 18 INJECTION, SOLUTION INTRAVENOUS at 09:23

## 2019-06-26 RX ADMIN — KETAMINE HYDROCHLORIDE 20 MG: 10 INJECTION INTRAMUSCULAR; INTRAVENOUS at 09:29

## 2019-06-26 RX ADMIN — DEXAMETHASONE SODIUM PHOSPHATE 8 MG: 4 INJECTION, SOLUTION INTRAMUSCULAR; INTRAVENOUS at 08:48

## 2019-06-26 NOTE — ANESTHESIA PREPROCEDURE EVALUATION
Anesthesia Evaluation     Patient summary reviewed and Nursing notes reviewed   NPO Solid Status: > 8 hours  NPO Liquid Status: < 2 hours           Airway   Mallampati: II  TM distance: >3 FB  Neck ROM: full  No difficulty expected  Dental    (+) edentulous    Pulmonary     breath sounds clear to auscultation  (+) a smoker ( quit 5 months ago. had been smoking 1-2ppd x  20 years) Former Abstained day of surgery,   Cardiovascular     ECG reviewed  PT is on anticoagulation therapy  Rhythm: regular  Rate: normal    (+) past MI ( nstemi 2/2019. stent. no cp/sob since) , cardiac stents hyperlipidemia,       Neuro/Psych  GI/Hepatic/Renal/Endo    (+)  GERD well controlled,      Musculoskeletal (-) negative ROS    Abdominal    Substance History - negative use     OB/GYN          Other        ROS/Med Hx Other: Last plavix yesterday morning  Last asa yesterday morning    Sips of water 1 hour ago    ECG 12 Lead   Order: [id number]   Status:  Final result   Visible to patient:  No (Not Released) Next appt:  11/18/2019 at 09:45 AM in Cardiology (Lizz Rodriguez MD)     Narrative       HEART RATE= 57  bpm  RR Interval= 1052  ms  NV Interval= 192  ms  P Horizontal Axis= 25  deg  P Front Axis= 62  deg  QRSD Interval= 75  ms  QT Interval= 390  ms  QRS Axis= 24  deg  T Wave Axis= 25  deg  - NORMAL ECG -  Sinus rhythm  No change from prior tracing  Electronically Signed By: Emmanuelle Good ([facility]) 08-Feb-2019 19:55:49  Date and Time of Study: 2019-02-08 19:18:36    Specimen Collected: 02/08/19 19:18      Interpretation Summary     · Estimated EF appears to be in the range of 61 - 65%.  · Left ventricular diastolic dysfunction is noted (grade I) consistent with impaired relaxation.  · Normal right ventricular cavity size and systolic function noted.  · Mild mitral valve regurgitation is present  · There is no evidence of pericardial effusion    Interpretation Summary     · No significant ST changes were noted (on top of baseline ST  abnormality). PVCs were noted during exercise.                                   Anesthesia Plan    ASA 3     MAC     intravenous induction   Anesthetic plan, all risks, benefits, and alternatives have been provided, discussed and informed consent has been obtained with: patient.  Use of blood products discussed with patient  Consented to blood products.   Plan discussed with CRNA.

## 2019-06-27 ENCOUNTER — TELEPHONE (OUTPATIENT)
Dept: SURGERY | Facility: CLINIC | Age: 69
End: 2019-06-27

## 2019-06-27 ENCOUNTER — OFFICE VISIT (OUTPATIENT)
Dept: SURGERY | Facility: CLINIC | Age: 69
End: 2019-06-27

## 2019-06-27 ENCOUNTER — HOSPITAL ENCOUNTER (EMERGENCY)
Facility: HOSPITAL | Age: 69
Discharge: HOME | End: 2019-06-27
Attending: EMERGENCY MEDICINE | Admitting: EMERGENCY MEDICINE

## 2019-06-27 VITALS
WEIGHT: 170 LBS | HEIGHT: 69 IN | OXYGEN SATURATION: 97 % | DIASTOLIC BLOOD PRESSURE: 94 MMHG | TEMPERATURE: 98.7 F | SYSTOLIC BLOOD PRESSURE: 150 MMHG | RESPIRATION RATE: 16 BRPM | HEART RATE: 64 BPM | BODY MASS INDEX: 25.18 KG/M2

## 2019-06-27 DIAGNOSIS — Z48.89 ENCOUNTER FOR POSTOPERATIVE WOUND CARE: Primary | ICD-10-CM

## 2019-06-27 DIAGNOSIS — Z09 FOLLOW UP: Primary | ICD-10-CM

## 2019-06-27 LAB
CYTO UR: NORMAL
LAB AP CASE REPORT: NORMAL
LAB AP DIAGNOSIS COMMENT: NORMAL
PATH REPORT.FINAL DX SPEC: NORMAL
PATH REPORT.GROSS SPEC: NORMAL

## 2019-06-27 PROCEDURE — 99281 EMR DPT VST MAYX REQ PHY/QHP: CPT | Performed by: EMERGENCY MEDICINE

## 2019-06-27 PROCEDURE — 99283 EMERGENCY DEPT VISIT LOW MDM: CPT

## 2019-06-27 PROCEDURE — 99024 POSTOP FOLLOW-UP VISIT: CPT | Performed by: SURGERY

## 2019-06-27 NOTE — TELEPHONE ENCOUNTER
Patient came by the office after he left the ER for wound bleeding after surgery yesterday. States he couldn't get the bleeding to stop this morning and he seeked treatment at the ER.     I checked his dressing and from the time the dressing was changed in the ER it was saturated again. Applied pressure and changed dressing. Patient was flushed in his face and not feeling well. Checked his BP and it was 160/98.

## 2019-06-27 NOTE — TELEPHONE ENCOUNTER
Spoke to Dr. Wheeler and she will see the patient in the office at 4 pn today to change the packing and dressing of the wound.    I spoke to patient and he will be here today at 4 pm

## 2019-06-27 NOTE — TELEPHONE ENCOUNTER
Please see about notes, does he need to be seen before tomorrow or is there something specific I need to do?

## 2019-06-27 NOTE — PROGRESS NOTES
1 day PO excisional debridement skin and subcutaneous tissue right abdominal wall     CC: right side abdominal wound bleeding     HPI: This is a pleasant 69-year-old gentleman who underwent an incision and drainage of a right sided abdominal wall infection on 6/27/2019.  Patient had been on blood thinners, Plavix and aspirin.  Patient took a Plavix and aspirin this a.m.  Patient presented to the emergency room at 0400 with saturated bandage.  The outer bandage was changed in the emergency room and a pressure dressing was placed.  Patient presented to the office this a.m. and Dr. Mt Kelly's MA, change the outer dressing and reapplied a pressure dressing.  Leticia notified me of the patient's visit at about 9:30 am.  I have asked her to have the patient come in to the office for me to check the wound to see if I needed to cauterize or apply silver nitrate.  Patient reports that has continued to saturate the bandage.  Patient denies any dizziness or lightheadedness.  Patient denies nausea or vomiting.    Physical exam:  General: Patient is alert and oriented x3 in no acute distress, patient is able to walk without any difficulties and climb onto the examining table.  Chest: Clear to auscultation  Heart: Regular rate and rhythm  Abdomen:  Patient had on a 6 inch Ace bandage which was removed.  The gauze dressing underneath the bandage was saturated.  The outer bandage was removed as well as the packing.  Patient had several areas of skin edge bleeding.  Silver nitrate was applied to the skin edges and Surgicel was placed in the wound.  There was no further evidence of ongoing bleeding.  The wound was repacked and a 6 inch Ace bandage was reapplied.  Patient will follow-up with Dr. Amor tomorrow at his scheduled appointment time.    Assessment and plan:  Surgical wound bleeding on the skin edges was controlled with silver nitrate and Surgicel packing.  The wound was then repacked and a 6 inch Kerlix was placed  around his abdomen for a pressure dressing.  Patient was instructed not to take any Plavix for the next 2 days and continue his aspirin.  Patient was instructed to follow-up with Dr. Amor tomorrow as scheduled.  Patient was also instructed to call our service if he continued to saturate his bandages.

## 2019-06-28 ENCOUNTER — OFFICE VISIT (OUTPATIENT)
Dept: SURGERY | Facility: CLINIC | Age: 69
End: 2019-06-28

## 2019-06-28 DIAGNOSIS — Z09 SURGICAL FOLLOW-UP CARE: Primary | ICD-10-CM

## 2019-06-28 PROCEDURE — 99024 POSTOP FOLLOW-UP VISIT: CPT | Performed by: SURGERY

## 2019-06-28 NOTE — PROGRESS NOTES
2 day PO excisional debridement skin - states things seem to be some better since yesterday  He had some bleeding yesterday some silver nitrate and Surgicel was applied.  The dressing was placed yesterday is intact with a mild amount of bleeding.  There is no evidence of ongoing bleeding at the site.  His pathology was reviewed and discussed with the patient.  The outer Surgicel was removed and replaced.  The inner Surgicel was fragmenting so was left indwelling.  Dry dressing was placed over the site he should continue to hold his Plavix tomorrow and can remove resume it on Sunday and I will see him back in the office on Monday to recheck the wound

## 2019-07-01 ENCOUNTER — OFFICE VISIT (OUTPATIENT)
Dept: SURGERY | Facility: CLINIC | Age: 69
End: 2019-07-01

## 2019-07-01 DIAGNOSIS — T14.8XXA OPEN WOUND: Primary | ICD-10-CM

## 2019-07-01 PROCEDURE — 99212 OFFICE O/P EST SF 10 MIN: CPT | Performed by: SURGERY

## 2019-07-05 ENCOUNTER — OFFICE VISIT (OUTPATIENT)
Dept: SURGERY | Facility: CLINIC | Age: 69
End: 2019-07-05

## 2019-07-05 DIAGNOSIS — Z09 SURGICAL FOLLOW-UP CARE: Primary | ICD-10-CM

## 2019-07-05 PROCEDURE — 99211 OFF/OP EST MAY X REQ PHY/QHP: CPT | Performed by: SURGERY

## 2019-07-05 NOTE — PROGRESS NOTES
9 day PO excisional debridement skin. With no complaints  Complains of some discomfort at the site.  He has been getting in the shower with this.  There is no cellulitis.  There is some adherent clot at the base of the wound.  He should continue local wound care and I will see him back on Wednesday

## 2019-07-10 ENCOUNTER — OFFICE VISIT (OUTPATIENT)
Dept: SURGERY | Facility: CLINIC | Age: 69
End: 2019-07-10

## 2019-07-10 DIAGNOSIS — T14.8XXA OPEN WOUND: Primary | ICD-10-CM

## 2019-07-10 PROCEDURE — 99211 OFF/OP EST MAY X REQ PHY/QHP: CPT | Performed by: SURGERY

## 2019-07-10 NOTE — PROGRESS NOTES
2 wk S/P excisional debridement skin - wound check - no problems at this time  He feels well.  There is no bleeding.  He is doing once a day shower and a dry dressing.  The wound is frederick and granulating.  There is no cellulitis.  He should continue local wound care and I will see him back in 2 weeks

## 2019-07-24 ENCOUNTER — OFFICE VISIT (OUTPATIENT)
Dept: SURGERY | Facility: CLINIC | Age: 69
End: 2019-07-24

## 2019-07-24 VITALS
HEIGHT: 69 IN | SYSTOLIC BLOOD PRESSURE: 146 MMHG | WEIGHT: 170 LBS | DIASTOLIC BLOOD PRESSURE: 83 MMHG | RESPIRATION RATE: 18 BRPM | BODY MASS INDEX: 25.18 KG/M2

## 2019-07-24 DIAGNOSIS — T14.8XXA OPEN WOUND: Primary | ICD-10-CM

## 2019-07-24 PROCEDURE — 99211 OFF/OP EST MAY X REQ PHY/QHP: CPT | Performed by: SURGERY

## 2019-07-24 NOTE — PROGRESS NOTES
Subjective   Justo Castellano is a 69 y.o. male here for   Chief Complaint   Patient presents with   • Post-op   • Follow-up   • Wound Check   Wound check  - states everything is going good    History of Present Illness says the wound is much improved.  He did have multiple questions were insect bites and insect repellent's.  He is get in the shower with this daily.    The following portions of the patient's history were reviewed and updated as appropriate: allergies, current medications, past family history, past medical history, past social history, past surgical history and problem list.    Past Medical History:   Diagnosis Date   • Antiplatelet or antithrombotic long-term use     Plavix   • Coronary artery disease 02/2019    stent placed   • Essential hypertension 3/22/2019   • GERD (gastroesophageal reflux disease)    • Mixed hyperlipidemia 3/22/2019   • Myocardial infarct (CMS/HCC) 02/2019    Dr Rodriguez follows       Past Surgical History:   Procedure Laterality Date   • CARDIAC CATHETERIZATION N/A 2/8/2019    Procedure: Left Heart Cath;  Surgeon: Emmanuelle Good MD;  [facility];  Service: Cardiovascular   • CARDIAC CATHETERIZATION N/A 2/8/2019    Procedure: Coronary angiography;  Surgeon: Emmanuelle Good MD;  Location:  [facility] CATH INVASIVE LOCATION;  Service: Cardiovascular   • CARDIAC CATHETERIZATION N/A 2/8/2019    Procedure: Left ventriculography;  Surgeon: Emmanuelle Good MD;  Location:  [facility] CATH INVASIVE LOCATION;  Service: Cardiovascular   • CARDIAC CATHETERIZATION N/A 2/8/2019    Procedure: Stent ABDULKADIR coronary;  Surgeon: Emmanuelle Good MD;  Location:  [facility] CATH INVASIVE LOCATION;  Service: Cardiovascular   • HAND SURGERY Right    • INCISION AND DRAINAGE ABSCESS      DR. RIVERA - INSECT BITE - YEARS AGO   • ME RT/LT HEART CATHETERS N/A 2/8/2019    Procedure: Percutaneous Coronary Intervention;  Surgeon: Emmanuelle Good MD;  Location:  [facility] CATH INVASIVE LOCATION;   Service: Cardiovascular   • WOUND EXPLORATION TRUNK Right 2019    Procedure: excisional debridement skin and subcutaneous tissue right abdominal wall 5.1 x 2.3 x 2.4 cm;  Surgeon: Bryan Amor MD;  Location: [facility];  Service: General       Family History   Problem Relation Age of Onset   • Diabetes Sister    • Malig Hyperthermia Neg Hx        Social History     Socioeconomic History   • Marital status:      Spouse name: Not on file   • Number of children: Not on file   • Years of education: Not on file   • Highest education level: Not on file   Tobacco Use   • Smoking status: Former Smoker     Packs/day: 0.50     Years: 40.00     Pack years: 20.00     Types: Cigarettes     Last attempt to quit: 2019     Years since quittin.4   • Smokeless tobacco: Never Used   Substance and Sexual Activity   • Alcohol use: No     Alcohol/week: 0.0 oz     Frequency: Never   • Drug use: No   • Sexual activity: Defer       Current Outpatient Medications on File Prior to Visit   Medication Sig Dispense Refill   • aspirin 81 MG EC tablet Take 81 mg by mouth Daily.     • atorvastatin (LIPITOR) 10 MG tablet Take 0.5 tablets by mouth Every Night. 45 tablet 6   • clopidogrel (PLAVIX) 75 MG tablet Take 1 tablet by mouth Daily. 90 tablet 3   • Multiple Vitamins-Minerals (MULTIVITAMIN WITH MINERALS) tablet tablet Take 1 tablet by mouth Daily.     • raNITIdine (ZANTAC) 150 MG tablet Take 150 mg by mouth 2 (Two) Times a Day.       No current facility-administered medications on file prior to visit.          Review of Systems      Objective   Physical Exam the wound is nearly closed.  There is no cellulitis or abscess.  Is now very superficial and should re-epithelialize from the edges.  Continues daily shower.  He says he like to come back and see me to completion.              Assessment/Plan   Open wound  Continue local wound care I will see him back in 2 weeks

## 2019-08-07 ENCOUNTER — OFFICE VISIT (OUTPATIENT)
Dept: SURGERY | Facility: CLINIC | Age: 69
End: 2019-08-07

## 2019-08-07 VITALS
DIASTOLIC BLOOD PRESSURE: 80 MMHG | BODY MASS INDEX: 25.33 KG/M2 | RESPIRATION RATE: 18 BRPM | WEIGHT: 171 LBS | SYSTOLIC BLOOD PRESSURE: 142 MMHG | HEIGHT: 69 IN

## 2019-08-07 DIAGNOSIS — T14.8XXA OPEN WOUND: Primary | ICD-10-CM

## 2019-08-07 PROCEDURE — 99211 OFF/OP EST MAY X REQ PHY/QHP: CPT | Performed by: SURGERY

## 2019-08-07 NOTE — PROGRESS NOTES
Subjective   Justo Castellano is a 69 y.o. male here for   Chief Complaint   Patient presents with   • Wound Check   • Follow-up   2 WK F/U - wound check    History of Present Illness is without complaints she is here today for follow-up on his lower quadrant wound.    The following portions of the patient's history were reviewed and updated as appropriate: allergies, current medications, past family history, past medical history, past social history, past surgical history and problem list.    Past Medical History:   Diagnosis Date   • Antiplatelet or antithrombotic long-term use     Plavix   • Coronary artery disease 02/2019    stent placed   • Essential hypertension 3/22/2019   • GERD (gastroesophageal reflux disease)    • Mixed hyperlipidemia 3/22/2019   • Myocardial infarct (CMS/HCC) 02/2019    Dr Rodriguez follows       Past Surgical History:   Procedure Laterality Date   • CARDIAC CATHETERIZATION N/A 2/8/2019    Procedure: Left Heart Cath;  Surgeon: Emmanuelle Good MD;  Location:  [facility] CATH INVASIVE LOCATION;  Service: Cardiovascular   • CARDIAC CATHETERIZATION N/A 2/8/2019    Procedure: Coronary angiography;  Surgeon: Emmanuelle Good MD;  Location:  [facility] CATH INVASIVE LOCATION;  Service: Cardiovascular   • CARDIAC CATHETERIZATION N/A 2/8/2019    Procedure: Left ventriculography;  Surgeon: Emmanuelle Good MD;  Location:  [facility] CATH INVASIVE LOCATION;  Service: Cardiovascular   • CARDIAC CATHETERIZATION N/A 2/8/2019    Procedure: Stent ABDULKADIR coronary;  Surgeon: Emmanuelle Good MD;  Location:  [facility] CATH INVASIVE LOCATION;  Service: Cardiovascular   • HAND SURGERY Right    • INCISION AND DRAINAGE ABSCESS      DR. RIVERA - INSECT BITE - YEARS AGO   • [place] RT/LT HEART CATHETERS N/A 2/8/2019    Procedure: Percutaneous Coronary Intervention;  Surgeon: Emmanuelle Good MD;  Location:  [facility] CATH INVASIVE LOCATION;  Service: Cardiovascular   • WOUND EXPLORATION TRUNK Right 6/26/2019    Procedure: excisional  debridement skin and subcutaneous tissue right abdominal wall 5.1 x 2.3 x 2.4 cm;  Surgeon: Bryan Amor MD;  Location: [facility];  Service: General       Family History   Problem Relation Age of Onset   • Diabetes Sister    • Malig Hyperthermia Neg Hx        Social History     Socioeconomic History   • Marital status:      Spouse name: Not on file   • Number of children: Not on file   • Years of education: Not on file   • Highest education level: Not on file   Tobacco Use   • Smoking status: Former Smoker     Packs/day: 0.50     Years: 40.00     Pack years: 20.00     Types: Cigarettes     Last attempt to quit: 2019     Years since quittin.5   • Smokeless tobacco: Never Used   Substance and Sexual Activity   • Alcohol use: No     Alcohol/week: 0.0 oz     Frequency: Never   • Drug use: No   • Sexual activity: Defer       Current Outpatient Medications on File Prior to Visit   Medication Sig Dispense Refill   • aspirin 81 MG EC tablet Take 81 mg by mouth Daily.     • atorvastatin (LIPITOR) 10 MG tablet Take 0.5 tablets by mouth Every Night. 45 tablet 6   • clopidogrel (PLAVIX) 75 MG tablet Take 1 tablet by mouth Daily. 90 tablet 3   • Multiple Vitamins-Minerals (MULTIVITAMIN WITH MINERALS) tablet tablet Take 1 tablet by mouth Daily.     • raNITIdine (ZANTAC) 150 MG tablet Take 150 mg by mouth 2 (Two) Times a Day.       No current facility-administered medications on file prior to visit.          Review of Systems      Objective   Physical Exam wound has sealed.  It is completely re-epithelialized there is some dry skin centrally on the scar I recommended he use daily moisturizing cream or lotion to the site after he gets out of the shower for the next 2 weeks              Assessment/Plan   Open wound has healed recommendations as above I will see him back PRN

## 2019-08-10 NOTE — PROGRESS NOTES
5 day PO excisional debridement skin - no problems at this time, he started back on the blood thinner yesterday  He has had no further bleeding.  Most of the Surgicel was removed and I saw no evidence of ongoing bleeding.  Saline wet-to-dry dressing was placed in the wound and then a dry gauze over the site.  He can remove the saline wet-to-dry dressing tomorrow and get the shower daily then a dry dressing over the site.  He is leaving town tomorrow and as such I will see him back in the office Friday afternoon.  If he has bleeding he is been advised he will need to go to the emergency room wherever he is   left normal/right normal

## 2019-08-27 ENCOUNTER — TELEPHONE (OUTPATIENT)
Dept: CARDIOLOGY | Facility: CLINIC | Age: 69
End: 2019-08-27

## 2019-08-27 NOTE — TELEPHONE ENCOUNTER
Pt called and states that he need a [employer] letter from his cardiologist........Please advise        Thanks   Pilar DAVE

## 2019-09-27 ENCOUNTER — TELEPHONE (OUTPATIENT)
Dept: CARDIOLOGY | Facility: CLINIC | Age: 69
End: 2019-09-27

## 2019-09-27 NOTE — TELEPHONE ENCOUNTER
Pt called and wants to know if he can take regular Claritin for allegry  and extra-strength Tylenol for pain? He wants to know if its ok for him to have a flu shot?......Please advise      Thanks  Pilar DAVE

## 2019-11-18 ENCOUNTER — OFFICE VISIT (OUTPATIENT)
Dept: CARDIOLOGY | Facility: CLINIC | Age: 69
End: 2019-11-18

## 2019-11-18 VITALS
SYSTOLIC BLOOD PRESSURE: 158 MMHG | OXYGEN SATURATION: 95 % | HEART RATE: 61 BPM | WEIGHT: 180 LBS | RESPIRATION RATE: 16 BRPM | DIASTOLIC BLOOD PRESSURE: 90 MMHG | HEIGHT: 69 IN | BODY MASS INDEX: 26.66 KG/M2

## 2019-11-18 DIAGNOSIS — E78.2 MIXED HYPERLIPIDEMIA: ICD-10-CM

## 2019-11-18 DIAGNOSIS — I21.4 NSTEMI (NON-ST ELEVATED MYOCARDIAL INFARCTION) (HCC): Primary | ICD-10-CM

## 2019-11-18 DIAGNOSIS — I10 ESSENTIAL HYPERTENSION: ICD-10-CM

## 2019-11-18 PROCEDURE — 93000 ELECTROCARDIOGRAM COMPLETE: CPT | Performed by: INTERNAL MEDICINE

## 2019-11-18 PROCEDURE — 99214 OFFICE O/P EST MOD 30 MIN: CPT | Performed by: INTERNAL MEDICINE

## 2019-11-18 NOTE — PROGRESS NOTES
Date of Office Visit: 2019  Encounter Provider: Lizz Rodriguez MD  Place of Service: [facility] CARDIOLOGY  Patient Name: Justo Castellano  :1950      Patient ID:  Justo Castellano is a 69 y.o. male is here for  followup for CAD.         History of Present Illness    He presented to the [facility] ER on 19 with chest burning and had an unremarkable EKG but a troponin of 0.231.   He was transferred to [facility] where he had a cardiac catheterization for NSTEMI.   He underwent stenting of OM2 receiving 2.5 x 28 mm Xience ABDULKADIR stent.  This caused 90% stenosis in the circumflex which was then opened with kissing balloon technique into circumflex and OM 2.     Echo done 3/4/19 showed ejection fraction 60-65% with grade 1 diastolic dysfunction and mild mitral insufficiency.  He had a treadmill stress study exercising on a Mike protocol for 8 minutes done 3/4/19.  This showed no acute ST changes.  PVCs were noted with exercise.  Patient was hypertensive with exercise.     He has hypertension and hyperlipidemia.     He checks his blood pressure at home and it runs 130s over 80s.  Laboratory values in 2019 show normal BMP and CBC.  Lipids on 2019 show HDL 37, LDL 84.  Echo done 3/4/2019 showed ejection fraction 61-65% with grade 1 diastolic dysfunction and mild mitral insufficiency.  He had no ischemic ECG changes noted with a treadmill stress study done 3/4/2019.  PVCs were noted during exercise.  This was all done in response to chest pain.  He was hypertensive with exercise.     He is walking 3 miles a day and feels well.  He has had no tachycardia, dizziness, chest pain or pressure.  He has no orthopnea or PND but still struggles with GERD and bulging.  He had no vomiting blood or blood in stool.    Past Medical History:   Diagnosis Date   • Antiplatelet or antithrombotic long-term use     Plavix   • Coronary artery disease 2019     stent placed   • Essential hypertension 3/22/2019   • GERD (gastroesophageal reflux disease)    • Mixed hyperlipidemia 3/22/2019   • Myocardial infarct (CMS/HCC) 02/2019    Dr Rodriguez follows         Past Surgical History:   Procedure Laterality Date   • CARDIAC CATHETERIZATION N/A 2/8/2019    Procedure: Left Heart Cath;  Surgeon: Emmanuelle Good MD;  Location:  [facility] CATH INVASIVE LOCATION;  Service: Cardiovascular   • CARDIAC CATHETERIZATION N/A 2/8/2019    Procedure: Coronary angiography;  Surgeon: Emmanuelle Good MD;  Location:  [facility] CATH INVASIVE LOCATION;  Service: Cardiovascular   • CARDIAC CATHETERIZATION N/A 2/8/2019    Procedure: Left ventriculography;  Surgeon: Emmanuelle Good MD;  Location:  [facility] CATH INVASIVE LOCATION;  Service: Cardiovascular   • CARDIAC CATHETERIZATION N/A 2/8/2019    Procedure: Stent ABDULKADIR coronary;  Surgeon: Emmanuelle Good MD;  Location:  [facility] CATH INVASIVE LOCATION;  Service: Cardiovascular   • HAND SURGERY Right    • INCISION AND DRAINAGE ABSCESS      DR. AMOR - INSECT BITE - YEARS AGO   • [place] RT/LT HEART CATHETERS N/A 2/8/2019    Procedure: Percutaneous Coronary Intervention;  Surgeon: Emmanuelle Good MD;  Location:  [facility] CATH INVASIVE LOCATION;  Service: Cardiovascular   • WOUND EXPLORATION TRUNK Right 6/26/2019    Procedure: excisional debridement skin and subcutaneous tissue right abdominal wall 5.1 x 2.3 x 2.4 cm;  Surgeon: Bryan Amor MD;  Location: [facility];  Service: General       Current Outpatient Medications on File Prior to Visit   Medication Sig Dispense Refill   • aspirin 81 MG EC tablet Take 81 mg by mouth Daily.     • atorvastatin (LIPITOR) 10 MG tablet Take 0.5 tablets by mouth Every Night. 45 tablet 6   • clopidogrel (PLAVIX) 75 MG tablet Take 1 tablet by mouth Daily. 90 tablet 3   • Multiple Vitamins-Minerals (MULTIVITAMIN WITH MINERALS) tablet tablet Take 1 tablet by mouth Daily.     • raNITIdine (ZANTAC) 150 MG tablet Take 150 mg by mouth 2  (Two) Times a Day.       No current facility-administered medications on file prior to visit.        Social History     Socioeconomic History   • Marital status:      Spouse name: Not on file   • Number of children: Not on file   • Years of education: Not on file   • Highest education level: Not on file   Tobacco Use   • Smoking status: Former Smoker     Packs/day: 0.50     Years: 40.00     Pack years: 20.00     Types: Cigarettes     Last attempt to quit: 2019     Years since quittin.7   • Smokeless tobacco: Never Used   Substance and Sexual Activity   • Alcohol use: No     Alcohol/week: 0.0 oz     Frequency: Never   • Drug use: No   • Sexual activity: Defer           Review of Systems   Constitution: Negative.   HENT: Negative for congestion.    Eyes: Negative for vision loss in left eye and vision loss in right eye.   Respiratory: Negative.  Negative for cough, hemoptysis, shortness of breath, sleep disturbances due to breathing, snoring, sputum production and wheezing.    Endocrine: Negative.    Hematologic/Lymphatic: Negative.    Skin: Negative for poor wound healing and rash.   Musculoskeletal: Negative for falls, gout, muscle cramps and myalgias.   Gastrointestinal: Negative for abdominal pain, diarrhea, dysphagia, hematemesis, melena, nausea and vomiting.   Neurological: Negative for excessive daytime sleepiness, dizziness, headaches, light-headedness, loss of balance, seizures and vertigo.   Psychiatric/Behavioral: Negative for depression and substance abuse. The patient is not nervous/anxious.        Procedures    ECG 12 Lead  Date/Time: 2019 9:53 AM  Performed by: Lizz Rodriguez MD  Authorized by: Lizz Rodriguez MD   Comparison: compared with previous ECG   Similar to previous ECG  Rhythm: sinus rhythm  Ectopy: infrequent PVCs  ST Flattening: aVF    Clinical impression: abnormal EKG                Objective:      Vitals:    19 0946   BP: 158/90   BP Location: Left  "arm   Patient Position: Sitting   Cuff Size: Adult   Pulse: 61   Resp: 16   SpO2: 95%   Weight: 81.6 kg (180 lb)   Height: 175.3 cm (69\")     Body mass index is 26.58 kg/m².    Physical Exam   Constitutional: He is oriented to person, place, and time. He appears well-developed and well-nourished. No distress.   HENT:   Head: Normocephalic and atraumatic.   Eyes: Conjunctivae are normal. No scleral icterus.   Neck: Neck supple. No JVD present. Carotid bruit is not present. No thyromegaly present.   Cardiovascular: Normal rate, regular rhythm, S1 normal, S2 normal and intact distal pulses.  No extrasystoles are present. PMI is not displaced. Exam reveals no gallop.   No murmur heard.  Pulses:       Carotid pulses are 2+ on the right side, and 2+ on the left side.       Radial pulses are 2+ on the right side, and 2+ on the left side.        Dorsalis pedis pulses are 2+ on the right side, and 2+ on the left side.        Posterior tibial pulses are 2+ on the right side, and 2+ on the left side.   Pulmonary/Chest: Effort normal and breath sounds normal. No respiratory distress. He has no wheezes. He has no rhonchi. He has no rales. He exhibits no tenderness.   Abdominal: Soft. Bowel sounds are normal. He exhibits no distension, no abdominal bruit and no mass. There is no tenderness.   Musculoskeletal: He exhibits no edema or deformity.   Lymphadenopathy:     He has no cervical adenopathy.   Neurological: He is alert and oriented to person, place, and time. No cranial nerve deficit.   Skin: Skin is warm and dry. No rash noted. He is not diaphoretic. No cyanosis. No pallor. Nails show no clubbing.   Psychiatric: He has a normal mood and affect. Judgment normal.   Vitals reviewed.      Lab Review:       Assessment:      Diagnosis Plan   1. NSTEMI (non-ST elevated myocardial infarction) (CMS/HCC)     2. Essential hypertension     3. Mixed hyperlipidemia       1. CAD, s/p OM2 stent. No angina.   2. Hyperlipidemia, on " atorvastatin.   3. Elevated BP here but controlled at home.   4. .           Plan:       Stop asa due to GERD - see walt in 6 months.  Continue to exercise.  Labs done with Aundrea.

## 2019-12-09 RX ORDER — CLOPIDOGREL BISULFATE 75 MG/1
TABLET ORAL
Qty: 90 TABLET | Refills: 2 | Status: SHIPPED | OUTPATIENT
Start: 2019-12-09 | End: 2020-11-12 | Stop reason: SDUPTHER

## 2019-12-23 ENCOUNTER — OFFICE VISIT (OUTPATIENT)
Dept: SURGERY | Facility: CLINIC | Age: 69
End: 2019-12-23

## 2019-12-23 ENCOUNTER — TELEPHONE (OUTPATIENT)
Dept: GASTROENTEROLOGY | Facility: CLINIC | Age: 69
End: 2019-12-23

## 2019-12-23 VITALS
DIASTOLIC BLOOD PRESSURE: 78 MMHG | HEIGHT: 69 IN | BODY MASS INDEX: 26.66 KG/M2 | SYSTOLIC BLOOD PRESSURE: 142 MMHG | WEIGHT: 180 LBS | RESPIRATION RATE: 18 BRPM

## 2019-12-23 DIAGNOSIS — L72.3 SEBACEOUS CYST: Primary | ICD-10-CM

## 2019-12-23 PROCEDURE — 99213 OFFICE O/P EST LOW 20 MIN: CPT | Performed by: SURGERY

## 2019-12-23 RX ORDER — SULFAMETHOXAZOLE AND TRIMETHOPRIM 800; 160 MG/1; MG/1
1 TABLET ORAL 2 TIMES DAILY
Qty: 20 TABLET | Refills: 0 | Status: SHIPPED | OUTPATIENT
Start: 2019-12-23 | End: 2020-01-03

## 2019-12-23 NOTE — PROGRESS NOTES
Justo Castellano 69 y.o. male presents for eval of   Chief Complaint   Patient presents with   • Suspicious Skin Lesion     Possible spider bite under right arm        HPI   Above noted and agree.  This very interesting 69 year old has had several insect bits.  He woke up today with a lesion on his right axilla and he was concerned it may be another spider bit.  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  He says it does not itch.  It had developed into a small nodule.  He has no fevers or chills.  He has no other complaints.      Review of Systems          Current Outpatient Medications:   •  atorvastatin (LIPITOR) 10 MG tablet, Take 0.5 tablets by mouth Every Night., Disp: 45 tablet, Rfl: 6  •  clopidogrel (PLAVIX) 75 MG tablet, TAKE 1 TABLET BY MOUTH ONCE DAILY, Disp: 90 tablet, Rfl: 2  •  Multiple Vitamins-Minerals (MULTIVITAMIN WITH MINERALS) tablet tablet, Take 1 tablet by mouth Daily., Disp: , Rfl:   •  sulfamethoxazole-trimethoprim (BACTRIM DS) 800-160 MG per tablet, Take 1 tablet by mouth 2 (Two) Times a Day., Disp: 20 tablet, Rfl: 0        Allergies   Allergen Reactions   • Tape Rash     Plastic or silk/fabric tape           Past Medical History:   Diagnosis Date   • Antiplatelet or antithrombotic long-term use     Plavix   • Coronary artery disease 02/2019    stent placed   • Essential hypertension 3/22/2019   • GERD (gastroesophageal reflux disease)    • Mixed hyperlipidemia 3/22/2019   • Myocardial infarct (CMS/HCC) 02/2019    Dr Rodriguez follows           Past Surgical History:   Procedure Laterality Date   • CARDIAC CATHETERIZATION N/A 2/8/2019    Procedure: Left Heart Cath;  Surgeon: Emmanuelle Good MD;  Location:  [facility] C[facility] INVASIVE LOC[facility];  Service: Cardiovascular   • CARDIAC CATHETERIZATION N/A 2/8/2019    Procedure: Coronary angiography;  Surgeon: Emmanuelle Good MD;  Location:  [facility] C[facility] INVASIVE LOCATION;  Service: Cardiovascular   • CARDIAC CATHETERIZATION N/A 2/8/2019    Procedure:  Left ventriculography;  Surgeon: Emmanuelle Good MD;  Location:  [facility] INVASIVE [facility];  Service: Cardiovascular   • CARDIAC CATHETERIZATION N/A 2019    Procedure: Stent ABDULKADIR coronary;  Surgeon: Emmanuelle Good MD;  Location:  [facility] C[facility] INVASIVE LOCATION;  Service: Cardiovascular   • HAND SURGERY Right    • INCISION AND DRAINAGE ABSCESS      DR. AMOR - INSECT BITE - YEARS AGO   • [place] RT/LT HEART CATHETERS N/A 2019    Procedure: Percutaneous Coronary Intervention;  Surgeon: Emmanuelle Good MD;  Location:  [facility] INVAS[facility];  Service: Cardiovascular   • WOUND EXPLORATION TRUNK Right 2019    Procedure: excisional debridement skin and subcutaneous tissue right abdominal wall 5.1 x 2.3 x 2.4 cm;  Surgeon: Bryan Amor MD;  Location: [facility] OR;  Service: General           Social History     Tobacco Use   • Smoking status: Former Smoker     Packs/day: 0.50     Years: 40.00     Pack years: 20.00     Types: Cigarettes     Last attempt to quit: 2019     Years since quittin.8   • Smokeless tobacco: Never Used   Substance Use Topics   • Alcohol use: No     Alcohol/week: 0.0 standard drinks     Frequency: Never   • Drug use: No             There is no immunization history on file for this patient.        Physical Exam   Constitutional: He is oriented to person, place, and time. He appears well-developed and well-nourished.   HENT:   Head: Normocephalic and atraumatic.   Right Ear: External ear normal.   Left Ear: External ear normal.   Musculoskeletal: He exhibits no edema or deformity.   Neurological: He is alert and oriented to person, place, and time.   Skin:   Small less than 1 cm nodule with no fluctuance noted and no erythema noted.  There is a small eschar on the surface of the skin   Psychiatric: He has a normal mood and affect. His behavior is normal.   Nursing note and vitals reviewed.      Debilities/Disabilities Identified: None    Emotional Behavior: Appropriate      BP  "142/78   Resp 18   Ht 175.3 cm (69\")   Wt 81.6 kg (180 lb)   BMI 26.58 kg/m²         Justo was seen today for suspicious skin lesion.    Diagnoses and all orders for this visit:    Sebaceous cyst    Other orders  -     sulfamethoxazole-trimethoprim (BACTRIM DS) 800-160 MG per tablet; Take 1 tablet by mouth 2 (Two) Times a Day.    He can follow up with Dr. Amor next week.  We discussed adding warm compresses.      Thank you for allowing me to participate in the care of this interesting patient.          "

## 2019-12-23 NOTE — TELEPHONE ENCOUNTER
Pt came into the office wanting to be seen today for a possible spider bite under his right arm, close to the armpit.  He showed it to me and it looked more like a straight black line surrounded by a little redness, more of a scab appearance.  It was not circular in nature.  Pt is a little worried, however, based on his past history with spider bites.  Would like Dr. Cesar to take a quick look, if that's possible.  Call pt at # [phone].

## 2019-12-31 ENCOUNTER — OFFICE VISIT (OUTPATIENT)
Dept: SURGERY | Facility: CLINIC | Age: 69
End: 2019-12-31

## 2019-12-31 VITALS
WEIGHT: 181 LBS | HEIGHT: 69 IN | RESPIRATION RATE: 18 BRPM | BODY MASS INDEX: 26.81 KG/M2 | DIASTOLIC BLOOD PRESSURE: 72 MMHG | SYSTOLIC BLOOD PRESSURE: 140 MMHG

## 2019-12-31 DIAGNOSIS — S20.361D: Primary | ICD-10-CM
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
## 2019-12-31 PROCEDURE — 99212 OFFICE O/P EST SF 10 MIN: CPT | Performed by: SURGERY

## 2019-12-31 NOTE — PROGRESS NOTES
Subjective   Justo Castellano is a 69 y.o. male here for   Chief Complaint   Patient presents with   • Follow-up   states he believe it is getting better and he is still taking the antibiotic    History of Present Illness he is here today for follow-up on his right axilla.  Says he thinks it is slightly improved.  He has been taking the Bactrim.  He says there is no pain or drainage associated with it.  He took his Plavix today    The following portions of the patient's history were reviewed and updated as appropriate: allergies, current medications, past family history, past medical history, past social history, past surgical history and problem list.    Past Medical History:   Diagnosis Date   • Antiplatelet or antithrombotic long-term use     Plavix   • Coronary artery disease 02/2019    stent placed   • Essential hypertension 3/22/2019   • GERD (gastroesophageal reflux disease)    • Mixed hyperlipidemia 3/22/2019   • Myocardial infarct (CMS/HCC) 02/2019    Dr Rodriguez follows       Past Surgical History:   Procedure Laterality Date   • CARDIAC CATHETERIZATION N/A 2/8/2019    Procedure: Left Heart Cath;  Surgeon: Emmanuelle Good MD;  Location: [facility] CATH INVASIVE LOCATION;  Service: Cardiovascular   • CARDIAC CATHETERIZATION N/A 2/8/2019    Procedure: Coronary angiography;  Surgeon: Emmanuelle Good MD;  Location:  [facility] CATH INVASIVE LOC[facility];  Service: Cardiovascular   • CARDIAC CATHETERIZATION N/A 2/8/2019    Procedure: Left ventriculography;  Surgeon: Emmanuelle Good MD;  Location:  [facility] CATH INVASIVE LOCATION;  Service: Cardiovascular   • CARDIAC CATHETERIZATION N/A 2/8/2019    Procedure: Stent ABDULKADIR coronary;  Surgeon: Emmanuelle Good MD;  Location:  [facility] CATH INVASIVE LOC[facility];  Service: Cardiovascular   • HAND SURGERY Right    • INCISION AND DRAINAGE ABSCESS      DR. RIVERA - INSECT BITE - YEARS AGO   • [place] RT/LT HEART CATHETERS N/A 2/8/2019    Procedure: Percutaneous Coronary Intervention;  Surgeon:  Emmanuelle Good MD;  Location:  [facility] CATH INVASIVE LOCATION;  Service: Cardiovascular   • WOUND EXPLORATION TRUNK Right 2019    Procedure: excisional debridement skin and subcutaneous tissue right abdominal wall 5.1 x 2.3 x 2.4 cm;  Surgeon: Bryan Amor MD;  Location: [facility] OR;  Service: General       Family History   Problem Relation Age of Onset   • Diabetes Sister    • Malig Hyperthermia Neg Hx        Social History     Socioeconomic History   • Marital status:      Spouse name: Not on file   • Number of children: Not on file   • Years of education: Not on file   • Highest education level: Not on file   Tobacco Use   • Smoking status: Former Smoker     Packs/day: 0.50     Years: 40.00     Pack years: 20.00     Types: Cigarettes     Last attempt to quit: 2019     Years since quittin.9   • Smokeless tobacco: Never Used   Substance and Sexual Activity   • Alcohol use: No     Alcohol/week: 0.0 standard drinks     Frequency: Never   • Drug use: No   • Sexual activity: Defer       Current Outpatient Medications on File Prior to Visit   Medication Sig Dispense Refill   • atorvastatin (LIPITOR) 10 MG tablet Take 0.5 tablets by mouth Every Night. 45 tablet 6   • clopidogrel (PLAVIX) 75 MG tablet TAKE 1 TABLET BY MOUTH ONCE DAILY 90 tablet 2   • Multiple Vitamins-Minerals (MULTIVITAMIN WITH MINERALS) tablet tablet Take 1 tablet by mouth Daily.     • sulfamethoxazole-trimethoprim (BACTRIM DS) 800-160 MG per tablet Take 1 tablet by mouth 2 (Two) Times a Day. 20 tablet 0     No current facility-administered medications on file prior to visit.          Review of Systems      Objective   Physical Exam alert white male in no active distress.  He has a 1.2 cm area of black eschar with surrounding induration.  I see no evidence is cellulitis or abscess.  This is not consistent with a sebaceous cyst.              Assessment/Plan   Full-thickness skin loss  We will hold his Plavix for several days and  excise this in the office on Friday

## 2020-01-03 ENCOUNTER — PROCEDURE VISIT (OUTPATIENT)
Dept: SURGERY | Facility: CLINIC | Age: 70
End: 2020-01-03

## 2020-01-03 VITALS
HEIGHT: 69 IN | RESPIRATION RATE: 18 BRPM | WEIGHT: 180 LBS | SYSTOLIC BLOOD PRESSURE: 146 MMHG | BODY MASS INDEX: 26.66 KG/M2 | DIASTOLIC BLOOD PRESSURE: 76 MMHG

## 2020-01-03 DIAGNOSIS — S20.361A INSECT BITE OF RIGHT FRONT WALL OF THORAX, INITIAL ENCOUNTER: Primary | ICD-10-CM
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
## 2020-01-03 PROCEDURE — 11042 DBRDMT SUBQ TIS 1ST 20SQCM/<: CPT | Performed by: SURGERY

## 2020-01-03 NOTE — PROGRESS NOTES
Insect bite excision - 9 mm  States he completed the antibiotic yesterday  The lesion is as before.  The site was prepped draped locally infiltrated 1% lidocaine without epinephrine.  Skin and subcutaneous tissue was sharply debrided 9 mm x 1.2 cm.  We debrided this down to normal-appearing subcutaneous fat.  There is mild amount of bleeding.  Pressure was applied Surgicel was applied and a bulky dressing was applied.  He should leave the dressing on until Monday when I see him back in the office.  He can resume his Plavix tomorrow

## 2020-01-06 ENCOUNTER — OFFICE VISIT (OUTPATIENT)
Dept: SURGERY | Facility: CLINIC | Age: 70
End: 2020-01-06

## 2020-01-06 DIAGNOSIS — Z09 SURGICAL FOLLOW-UP CARE: Primary | ICD-10-CM

## 2020-01-06 PROCEDURE — 99024 POSTOP FOLLOW-UP VISIT: CPT | Performed by: SURGERY

## 2020-01-06 NOTE — PROGRESS NOTES
3 day PO excision - states no problems at this time  Has had no bleeding.  The dressing was taken down he does have some old clot but no active bleeding.  There is no evidence of cellulitis his pathology is still pending.  He is to get in the shower with this daily and I will see him back in a week

## 2020-01-08 LAB
DX ICD CODE: NORMAL
PATH REPORT.FINAL DX SPEC: NORMAL
PATH REPORT.GROSS SPEC: NORMAL
PATH REPORT.SITE OF ORIGIN SPEC: NORMAL
PATHOLOGIST NAME: NORMAL
PAYMENT PROCEDURE: NORMAL

## 2020-01-14 ENCOUNTER — OFFICE VISIT (OUTPATIENT)
Dept: SURGERY | Facility: CLINIC | Age: 70
End: 2020-01-14

## 2020-01-14 VITALS
SYSTOLIC BLOOD PRESSURE: 138 MMHG | HEIGHT: 69 IN | WEIGHT: 181 LBS | DIASTOLIC BLOOD PRESSURE: 75 MMHG | RESPIRATION RATE: 18 BRPM | BODY MASS INDEX: 26.81 KG/M2

## 2020-01-14 DIAGNOSIS — Z09 SURGICAL FOLLOW-UP CARE: Primary | ICD-10-CM

## 2020-01-14 PROCEDURE — 99024 POSTOP FOLLOW-UP VISIT: CPT | Performed by: SURGERY

## 2020-01-14 NOTE — PROGRESS NOTES
Subjective   Justo Castellano is a 69 y.o. male here for   Chief Complaint   Patient presents with   • Follow-up   • Wound Check   wound check - states everything is going good    History of Present Illness he says he thinks the wound is improved.  He is doing all kinds of precautions against ticks and spiders in his house and his barn.  He complains of some tape issues around the incision.    The following portions of the patient's history were reviewed and updated as appropriate: allergies, current medications, past family history, past medical history, past social history, past surgical history and problem list.    Past Medical History:   Diagnosis Date   • Antiplatelet or antithrombotic long-term use     Plavix   • Coronary artery disease 02/2019    stent placed   • Essential hypertension 3/22/2019   • GERD (gastroesophageal reflux disease)    • Mixed hyperlipidemia 3/22/2019   • Myocardial infarct (CMS/HCC) 02/2019    Dr Rodriguez follows       Past Surgical History:   Procedure Laterality Date   • CARDIAC CATHETERIZATION N/A 2/8/2019    Procedure: Left Heart Cath;  Surgeon: Emmanuelle Good MD;  Location: [facility] CATH INVASIVE LOCATION;  Service: Cardiovascular   • CARDIAC CATHETERIZATION N/A 2/8/2019    Procedure: Coronary angiography;  Surgeon: Emmanuelle Good MD;  Location: [facility] CATH INVASIVE LOCATION;  Service: Cardiovascular   • CARDIAC CATHETERIZATION N/A 2/8/2019    Procedure: Left ventriculography;  Surgeon: Emmanuelle Good MD;  Location:  [facility] CATH INVASIVE LOCATION;  Service: Cardiovascular   • CARDIAC CATHETERIZATION N/A 2/8/2019    Procedure: Stent ABDULKADIR coronary;  Surgeon: Emmanuelle Good MD;  Location: [facility] CATH INVASIVE LOCATION;  Service: Cardiovascular   • HAND SURGERY Right    • INCISION AND DRAINAGE ABSCESS      DR. RIVERA - INSECT BITE - YEARS AGO   • [place] RT/LT HEART CATHETERS N/A 2/8/2019    Procedure: Percutaneous Coronary Intervention;  Surgeon: Emmanuelle Good MD;  Location:   [facility] CATH INVASIVE LOCATION;  Service: Cardiovascular   • WOUND EXPLORATION TRUNK Right 2019    Procedure: excisional debridement skin and subcutaneous tissue right abdominal wall 5.1 x 2.3 x 2.4 cm;  Surgeon: Bryan Amor MD;  Location: [facility] OR;  Service: General       Family History   Problem Relation Age of Onset   • Diabetes Sister    • Malig Hyperthermia Neg Hx        Social History     Socioeconomic History   • Marital status:      Spouse name: Not on file   • Number of children: Not on file   • Years of education: Not on file   • Highest education level: Not on file   Tobacco Use   • Smoking status: Former Smoker     Packs/day: 0.50     Years: 40.00     Pack years: 20.00     Types: Cigarettes     Last attempt to quit: 2019     Years since quittin.9   • Smokeless tobacco: Never Used   Substance and Sexual Activity   • Alcohol use: No     Alcohol/week: 0.0 standard drinks     Frequency: Never   • Drug use: No   • Sexual activity: Defer       Current Outpatient Medications on File Prior to Visit   Medication Sig Dispense Refill   • atorvastatin (LIPITOR) 10 MG tablet Take 0.5 tablets by mouth Every Night. 45 tablet 6   • clopidogrel (PLAVIX) 75 MG tablet TAKE 1 TABLET BY MOUTH ONCE DAILY 90 tablet 2   • Multiple Vitamins-Minerals (MULTIVITAMIN WITH MINERALS) tablet tablet Take 1 tablet by mouth Daily.       No current facility-administered medications on file prior to visit.          Review of Systems      Objective   Physical Exam the wound is improved.  It is frederick and closing.  His pathology was consistent with an insect bite.  Continue local wound care he can use some bacitracin on the tape areas on his skin.              Assessment/Plan   Open wound  I will see him back in 2 weeks

## 2020-02-04 ENCOUNTER — OFFICE VISIT (OUTPATIENT)
Dept: SURGERY | Facility: CLINIC | Age: 70
End: 2020-02-04

## 2020-02-04 VITALS
BODY MASS INDEX: 27.55 KG/M2 | SYSTOLIC BLOOD PRESSURE: 154 MMHG | DIASTOLIC BLOOD PRESSURE: 84 MMHG | WEIGHT: 186 LBS | HEIGHT: 69 IN

## 2020-02-04 DIAGNOSIS — Z09 SURGICAL FOLLOW-UP CARE: Primary | ICD-10-CM

## 2020-02-04 PROCEDURE — 99024 POSTOP FOLLOW-UP VISIT: CPT | Performed by: SURGERY

## 2020-02-04 NOTE — PROGRESS NOTES
2 week f/u wound check. Wound has resolved.  His wound has sealed there is no evidence of cellulitis or inflammation.  We did discuss regimens for trying to avoid further problems with insect bites.  I will see him back PRN

## 2020-03-31 ENCOUNTER — OFFICE VISIT (OUTPATIENT)
Dept: SURGERY | Facility: CLINIC | Age: 70
End: 2020-03-31

## 2020-03-31 VITALS
BODY MASS INDEX: 27.4 KG/M2 | WEIGHT: 185 LBS | HEIGHT: 69 IN | RESPIRATION RATE: 18 BRPM | DIASTOLIC BLOOD PRESSURE: 76 MMHG | SYSTOLIC BLOOD PRESSURE: 142 MMHG

## 2020-03-31 DIAGNOSIS — S30.861A INSECT BITE OF ABDOMINAL WALL, INITIAL ENCOUNTER: Primary | ICD-10-CM
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
## 2020-03-31 PROCEDURE — 99211 OFF/OP EST MAY X REQ PHY/QHP: CPT | Performed by: SURGERY

## 2020-03-31 NOTE — PROGRESS NOTES
PATIENT INFORMATION  Justo Castellano       - 1950    CHIEF COMPLAINT  Chief Complaint   Patient presents with   • Skin Lesion   skin lesion on left side that was noticed yesterday    HISTORY OF PRESENT ILLNESS  HPI he was mowing and handling hay yesterday and he developed some redness of his left abdominal wall x2.  He does have a prior history of insect bites progressing to skin and subcutaneous necrosis.  Complains of some itching at the area but no fevers or chills or no pain        REVIEW OF SYSTEMS  Review of Systems      ACTIVE PROBLEMS  Patient Active Problem List    Diagnosis   • Open abdominal wall wound [S31.109A]   • Essential hypertension [I10]   • Mixed hyperlipidemia [E78.2]   • NSTEMI (non-ST elevated myocardial infarction) (CMS/HCC) [I21.4]         PAST MEDICAL HISTORY  Past Medical History:   Diagnosis Date   • Antiplatelet or antithrombotic long-term use     Plavix   • Coronary artery disease 2019    stent placed   • Essential hypertension 3/22/2019   • GERD (gastroesophageal reflux disease)    • Mixed hyperlipidemia 3/22/2019   • Myocardial infarct (CMS/HCC) 2019    Dr Rodriguez follows         SURGICAL HISTORY  Past Surgical History:   Procedure Laterality Date   • CARDIAC CATHETERIZATION N/A 2019    Procedure: Left Heart Cath;  Surgeon: Emmanuelle Good MD;  Location: [facility] CATH INVASIVE LOCATION;  Service: Cardiovascular   • CARDIAC CATHETERIZATION N/A 2019    Procedure: Coronary angiography;  Surgeon: Emmanuelle Good MD;  Location:  [facility] CATH INVASIVE LOCATION;  Service: Cardiovascular   • CARDIAC CATHETERIZATION N/A 2019    Procedure: Left ventriculography;  Surgeon: Emmanuelle Good MD;  [facility];  Service: Cardiovascular   • CARDIAC CATHETERIZATION N/A 2019    Procedure: Stent ABDULKADIR coronary;  Surgeon: Emmanuelle Good MD;  Location:  [facility] CATH INVASIVE LOCATION;  Service: Cardiovascular   • HAND SURGERY Right    •  "INCISION AND DRAINAGE ABSCESS      DR. AMOR - INSECT BITE - YEARS AGO   • NE RT/LT HEART CATHETERS N/A 2019    Procedure: Percutaneous Coronary Intervention;  Surgeon: Emmanuelle Good MD;  Location:  [facility] CATH INVASIVE LOCATION;  Service: Cardiovascular   • WOUND EXPLORATION TRUNK Right 2019    Procedure: excisional debridement skin and subcutaneous tissue right abdominal wall 5.1 x 2.3 x 2.4 cm;  Surgeon: Bryan Amor MD;  Location:  [facility] OR;  Service: General         FAMILY HISTORY  Family History   Problem Relation Age of Onset   • Diabetes Sister    • Malig Hyperthermia Neg Hx          SOCIAL HISTORY  Social History     Occupational History   • Not on file   Tobacco Use   • Smoking status: Former Smoker     Packs/day: 0.50     Years: 40.00     Pack years: 20.00     Types: Cigarettes     Last attempt to quit: 2019     Years since quittin.1   • Smokeless tobacco: Never Used   Substance and Sexual Activity   • Alcohol use: No     Alcohol/week: 0.0 standard drinks     Frequency: Never   • Drug use: No   • Sexual activity: Defer       Debilities/Disabilities Identified: None    Emotional Behavior: Appropriate    CURRENT MEDICATIONS    Current Outpatient Medications:   •  atorvastatin (LIPITOR) 10 MG tablet, Take 0.5 tablets by mouth Every Night., Disp: 45 tablet, Rfl: 6  •  clopidogrel (PLAVIX) 75 MG tablet, TAKE 1 TABLET BY MOUTH ONCE DAILY, Disp: 90 tablet, Rfl: 2  •  Multiple Vitamins-Minerals (MULTIVITAMIN WITH MINERALS) tablet tablet, Take 1 tablet by mouth Daily., Disp: , Rfl:     ALLERGIES  Tape    VITALS  Vitals:    20 1104   BP: 142/76   Resp: 18   Weight: 83.9 kg (185 lb)   Height: 175.3 cm (69\")       LAST RESULTS     No results found.    PHYSICAL EXAM  Physical Exam alert white male in no active distress.  Left abdominal wall he has 2 reddened areas that appear to be insect bites.  There is no evidence of any necrosis there is no cellulitis there is no " tenderness.    ASSESSMENT  Insect bites      PLAN  Supportive care only he does not need any antibiotics or intervention at this time he will call if he has increasing problems

## 2020-06-09 RX ORDER — ATORVASTATIN CALCIUM 10 MG/1
TABLET, FILM COATED ORAL
Qty: 45 TABLET | Refills: 2 | Status: SHIPPED | OUTPATIENT
Start: 2020-06-09 | End: 2021-02-23

## 2020-08-10 NOTE — PROGRESS NOTES
Date of Office Visit: 2020  Encounter Provider: CHRIS Duvall  Place of Service: [facility] CARDIOLOGY  Patient Name: Justo Castellano  :1950  Primary Cardiologist: Dr. Rodriguez    CC:  6 month follow up    Dear Dr. Guillaume    HPI: Justo Castellano is a pleasant 70 y.o. male who presents 2020 for cardiac follow up. He has hypertension and hyperlipidemia.      He presented to the [facility] ER on 19 with chest burning and had an unremarkable EKG but a troponin of 0.231.   He was transferred to [facility] where he had a cardiac catheterization for NSTEMI.   He underwent stenting of OM2 receiving 2.5 x 28 mm Xience ABDULKADIR stent.  This caused 90% stenosis in the circumflex which was then opened with kissing balloon technique into circumflex and OM 2.     Echo done 3/4/19 showed ejection fraction 60-65% with grade 1 diastolic dysfunction and mild mitral insufficiency.  He had a treadmill stress study exercising on a Mike protocol for 8 minutes done 3/4/19.  This showed no acute ST changes.  PVCs were noted with exercise.  Patient was hypertensive with exercise.     Echo done 3/4/2019 showed ejection fraction 61-65% with grade 1 diastolic dysfunction and mild mitral insufficiency.  He had no ischemic ECG changes noted with a treadmill stress study done 3/4/2019.  PVCs were noted during exercise.  This was all done in response to chest pain.  He was hypertensive with exercise.     Patient states he is doing well.  He denies any palpitations, shortness of breath, lower extremity edema.  He has had no episodes of dizziness or lightheadedness, syncope or presyncopal episodes.  He denies any chest pain or chest pressure.  He denies any fatigue.  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  He states he is even walking up hills at a [place] to make sure that he can pass his next physical.  He denies any  leg pain, numbness or tingling in his upper lower extremities.  He denies any snoring, PND, cough, orthopnea.  He is taking his medications as directed.  He denies any unexplained bleeding, dark or tarry stools.  He reports his blood pressures at home are 118-122/70-81.  Past Medical History:   Diagnosis Date   • Antiplatelet or antithrombotic long-term use     Plavix   • Coronary artery disease 02/2019    stent placed   • Essential hypertension 3/22/2019   • GERD (gastroesophageal reflux disease)    • Mixed hyperlipidemia 3/22/2019   • Myocardial infarct (CMS/HCC) 02/2019    Dr Rodriguez follows       Past Surgical History:   Procedure Laterality Date   • CARDIAC CATHETERIZATION N/A 2/8/2019    Procedure: Left Heart Cath;  Surgeon: Emmanuelle Good MD;  Location: [facility] CATH INVASIVE LOCATION;  Service: Cardiovascular   • CARDIAC CATHETERIZATION N/A 2/8/2019    Procedure: Coronary angiography;  Surgeon: Emmanuelle Good MD;  Location: [facility] CATH INVASIVE LOCATION;  Service: Cardiovascular   • CARDIAC CATHETERIZATION N/A 2/8/2019    Procedure: Left ventriculography;  Surgeon: Emmanuelle Good MD;  Location: [facility] CATH INVASIVE LOCATION;  Service: Cardiovascular   • CARDIAC CATHETERIZATION N/A 2/8/2019    Procedure: Stent ABDULKADIR coronary;  Surgeon: Emmanuelle Good MD;  [facility];  Service: Cardiovascular   • HAND SURGERY Right    • INCISION AND DRAINAGE ABSCESS      DR. AMOR - INSECT BITE - YEARS AGO   • IN RT/LT HEART CATHETERS N/A 2/8/2019    Procedure: Percutaneous Coronary Intervention;  Surgeon: Emmanuelle Good MD;  Location: [facility] CATH INVASIVE LOCATION;  Service: Cardiovascular   • WOUND EXPLORATION TRUNK Right 6/26/2019    Procedure: excisional debridement skin and subcutaneous tissue right abdominal wall 5.1 x 2.3 x 2.4 cm;  Surgeon: Bryan Amor MD;  Location: [facility];  Service: General       Social History     Socioeconomic History   • Marital status:       Spouse name: Not on file   • Number of children: Not on file   • Years of education: Not on file   • Highest education level: Not on file   Tobacco Use   • Smoking status: Former Smoker     Packs/day: 0.50     Years: 40.00     Pack years: 20.00     Types: Cigarettes     Last attempt to quit: 2019     Years since quittin.5   • Smokeless tobacco: Never Used   Substance and Sexual Activity   • Alcohol use: No     Alcohol/week: 0.0 standard drinks     Frequency: Never   • Drug use: No   • Sexual activity: Defer       Family History   Problem Relation Age of Onset   • Diabetes Sister    • Malig Hyperthermia Neg Hx        The following portion of the patient's history were reviewed and updated as appropriate: past medical history, past surgical history, past social history, past family history, allergies, current medications, and problem list.    Review of Systems   Constitution: Negative for diaphoresis, fever and malaise/fatigue.   HENT: Negative for congestion, hearing loss, hoarse voice, nosebleeds and sore throat.    Eyes: Negative for photophobia, vision loss in left eye, vision loss in right eye and visual disturbance.   Cardiovascular: Negative for chest pain, dyspnea on exertion, irregular heartbeat, leg swelling, near-syncope, orthopnea, palpitations, paroxysmal nocturnal dyspnea and syncope.   Respiratory: Negative for cough, hemoptysis, shortness of breath, sleep disturbances due to breathing, snoring, sputum production and wheezing.    Endocrine: Negative for cold intolerance, heat intolerance, polydipsia, polyphagia and polyuria.   Hematologic/Lymphatic: Negative for bleeding problem. Does not bruise/bleed easily.   Skin: Negative for color change, dry skin, poor wound healing, rash and suspicious lesions.   Musculoskeletal: Negative for arthritis, back pain, falls, gout, joint pain, joint swelling, muscle cramps, muscle weakness and myalgias.   Gastrointestinal: Negative for bloating, abdominal pain,  "constipation, diarrhea, dysphagia, melena, nausea and vomiting.   Neurological: Negative for excessive daytime sleepiness, dizziness, headaches, light-headedness, loss of balance, numbness, paresthesias, seizures, vertigo and weakness.   Psychiatric/Behavioral: Negative for depression, memory loss and substance abuse. The patient is not nervous/anxious.        Allergies   Allergen Reactions   • Tape Rash     Plastic or silk/fabric tape         Current Outpatient Medications:   •  atorvastatin (LIPITOR) 10 MG tablet, TAKE 1/2 (ONE-HALF) TABLET BY MOUTH ONCE DAILY IN THE EVENING, Disp: 45 tablet, Rfl: 2  •  clopidogrel (PLAVIX) 75 MG tablet, TAKE 1 TABLET BY MOUTH ONCE DAILY, Disp: 90 tablet, Rfl: 2  •  Multiple Vitamins-Minerals (MULTIVITAMIN WITH MINERALS) tablet tablet, Take 1 tablet by mouth Daily., Disp: , Rfl:         Objective:     Vitals:    08/11/20 1031   BP: 150/90   Pulse: 57   Resp: 16   SpO2: 98%   Weight: 83.9 kg (185 lb)   Height: 175.3 cm (69\")     Body mass index is 27.32 kg/m².      Physical Exam   Constitutional: He is oriented to person, place, and time. He appears well-developed and well-nourished. No distress.   HENT:   Head: Normocephalic and atraumatic.   Right Ear: External ear normal.   Left Ear: External ear normal.   Nose: Nose normal.   Eyes: Pupils are equal, round, and reactive to light. Conjunctivae are normal. Right eye exhibits no discharge. Left eye exhibits no discharge.   Neck: Normal range of motion. Neck supple. No JVD present. No tracheal deviation present. No thyromegaly present.   Cardiovascular: Normal rate, regular rhythm, normal heart sounds and intact distal pulses. Exam reveals no gallop and no friction rub.   No murmur heard.  Pulmonary/Chest: Effort normal and breath sounds normal. No respiratory distress. He has no wheezes. He has no rales. He exhibits no tenderness.   Abdominal: Soft. Bowel sounds are normal. He exhibits no distension. There is no tenderness. "   Musculoskeletal: Normal range of motion. He exhibits no edema, tenderness or deformity.   Lymphadenopathy:     He has no cervical adenopathy.   Neurological: He is alert and oriented to person, place, and time. Coordination normal.   Skin: Skin is warm and dry. No rash noted. No erythema.   Psychiatric: He has a normal mood and affect. His behavior is normal. Judgment and thought content normal.             ECG 12 Lead  Date/Time: 8/11/2020 10:36 AM  Performed by: Olga Valdez APRN  Authorized by: Olga Valdez APRN   Comparison: compared with previous ECG from 2/14/2020  Similar to previous ECG  Rhythm: sinus rhythm  Rate: normal  Conduction: conduction normal  ST Depression: aVF  T elevation: I and aVL  T inversion: III, aVR, aVF and V1  QRS axis: normal    Clinical impression: abnormal EKG  Comments: Discussed with Dr. Rodriguez              Assessment:       Diagnosis Plan   1. NSTEMI (non-ST elevated myocardial infarction) (CMS/HCC)     2. Essential hypertension     3. Mixed hyperlipidemia            Plan:       1. CAD, s/p OM2 stent. Denies angina, very active.  2. Hyperlipidemia - continue lipid lowering therapy, he is on lipitor  3. Elevated BP here but controlled at home. Home 1218-122/70s  4. .      RTO in 6 months with RM    As always, it has been a pleasure to participate in your patient's care. Thank you.       Sincerely,       CHRIS Duvall      Current Outpatient Medications:   •  atorvastatin (LIPITOR) 10 MG tablet, TAKE 1/2 (ONE-HALF) TABLET BY MOUTH ONCE DAILY IN THE EVENING, Disp: 45 tablet, Rfl: 2  •  clopidogrel (PLAVIX) 75 MG tablet, TAKE 1 TABLET BY MOUTH ONCE DAILY, Disp: 90 tablet, Rfl: 2  •  Multiple Vitamins-Minerals (MULTIVITAMIN WITH MINERALS) tablet tablet, Take 1 tablet by mouth Daily., Disp: , Rfl:     Dictated utilizing Dragon dictation

## 2020-08-11 ENCOUNTER — OFFICE VISIT (OUTPATIENT)
Dept: CARDIOLOGY | Facility: CLINIC | Age: 70
End: 2020-08-11

## 2020-08-11 VITALS
DIASTOLIC BLOOD PRESSURE: 90 MMHG | WEIGHT: 185 LBS | BODY MASS INDEX: 27.4 KG/M2 | HEART RATE: 57 BPM | SYSTOLIC BLOOD PRESSURE: 150 MMHG | HEIGHT: 69 IN | RESPIRATION RATE: 16 BRPM | OXYGEN SATURATION: 98 %

## 2020-08-11 DIAGNOSIS — I21.4 NSTEMI (NON-ST ELEVATED MYOCARDIAL INFARCTION) (HCC): Primary | ICD-10-CM

## 2020-08-11 DIAGNOSIS — E78.2 MIXED HYPERLIPIDEMIA: ICD-10-CM

## 2020-08-11 DIAGNOSIS — I10 ESSENTIAL HYPERTENSION: ICD-10-CM

## 2020-08-11 PROCEDURE — 93000 ELECTROCARDIOGRAM COMPLETE: CPT | Performed by: NURSE PRACTITIONER

## 2020-08-11 PROCEDURE — 99214 OFFICE O/P EST MOD 30 MIN: CPT | Performed by: NURSE PRACTITIONER

## 2020-10-01 ENCOUNTER — TELEPHONE (OUTPATIENT)
Dept: GASTROENTEROLOGY | Facility: CLINIC | Age: 70
End: 2020-10-01

## 2020-10-01 NOTE — TELEPHONE ENCOUNTER
PT CALLED TO SCHEDULE APPT FOR POSSIBLE SPIDER BITE ON HIS FOOT.  SCHEDULED Tuesday, 10/6.  THEN HE CALLED AGAIN TO MOVE APPT TO Monday, 10/5, BECAUSE SPOT IS COMING TO A HEAD AND IS THE SIZE OF A PENCIL ERASER.   SAYS IT IS NOT RED AND DOESN'T ITCH.  HE JUST CALLED BACK AGAIN AND WANTS TO KNOW IF HE SHOULD DISCONTINUE HIS BLOOD THINNERS FOR THE WEEKEND IN CASE DR. RIVERA HAS TO OPEN THE SPOT.  PLEASE ADVISE.

## 2020-10-02 ENCOUNTER — OFFICE VISIT (OUTPATIENT)
Dept: SURGERY | Facility: CLINIC | Age: 70
End: 2020-10-02

## 2020-10-02 VITALS — HEIGHT: 69 IN | BODY MASS INDEX: 27.32 KG/M2

## 2020-10-02 DIAGNOSIS — T30.0 BURN: Primary | ICD-10-CM

## 2020-10-02 PROCEDURE — 99212 OFFICE O/P EST SF 10 MIN: CPT | Performed by: SURGERY

## 2020-10-02 NOTE — PROGRESS NOTES
PATIENT INFORMATION  Justo Castellano       - 1950    CHIEF COMPLAINT  Chief Complaint   Patient presents with   • Wound Check     possible blister or spider bite on foot       HISTORY OF PRESENT ILLNESS  HPI he complains of a blister on his right medial foot.  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  The blister has increased in size.        REVIEW OF SYSTEMS  Review of Systems      ACTIVE PROBLEMS  Patient Active Problem List    Diagnosis   • Open abdominal wall wound [S31.109A]   • Essential hypertension [I10]   • Mixed hyperlipidemia [E78.2]   • NSTEMI (non-ST elevated myocardial infarction) (CMS/HCC) [I21.4]         PAST MEDICAL HISTORY  Past Medical History:   Diagnosis Date   • Antiplatelet or antithrombotic long-term use     Plavix   • Coronary artery disease 2019    stent placed   • Essential hypertension 3/22/2019   • GERD (gastroesophageal reflux disease)    • Mixed hyperlipidemia 3/22/2019   • Myocardial infarct (CMS/HCC) 2019    Dr Rodriguez follows         SURGICAL HISTORY  Past Surgical History:   Procedure Laterality Date   • CARDIAC CATHETERIZATION N/A 2019    Procedure: Left Heart Cath;  Surgeon: Emmanuelle Good MD;  Location: [facility] CATH INVASIVE LOCATION;  Service: Cardiovascular   • CARDIAC CATHETERIZATION N/A 2019    Procedure: Coronary angiography;  Surgeon: Emmanuelle Good MD;  Location:  [facility] CATH INVASIVE LOCATION;  Service: Cardiovascular   • CARDIAC CATHETERIZATION N/A 2019    Procedure: Left ventriculography;  Surgeon: Emmaunelle Good MD;  [facility];  Service: Cardiovascular   • CARDIAC CATHETERIZATION N/A 2019    Procedure: Stent ABDULKADIR coronary;  Surgeon: Emmanuelle Good MD;  [facility];  Service: Cardiovascular   • HAND SURGERY Right    • INCISION AND DRAINAGE ABSCESS      DR. RIVERA - INSECT BITE -  "YEARS AGO   • [place] RT/LT HEART CATHETERS N/A 2019    Procedure: Percutaneous Coronary Intervention;  Surgeon: Emmanuelle Good MD;  Location:  [facility] CATH INVASIVE LOCATION;  Service: Cardiovascular   • WOUND EXPLORATION TRUNK Right 2019    Procedure: excisional debridement skin and subcutaneous tissue right abdominal wall 5.1 x 2.3 x 2.4 cm;  Surgeon: Bryan Amor MD;  Location:  [facility] OR;  Service: General         FAMILY HISTORY  Family History   Problem Relation Age of Onset   • Diabetes Sister    • Malig Hyperthermia Neg Hx          SOCIAL HISTORY  Social History     Occupational History   • Not on file   Tobacco Use   • Smoking status: Former Smoker     Packs/day: 0.50     Years: 40.00     Pack years: 20.00     Types: Cigarettes     Quit date: 2019     Years since quittin.6   • Smokeless tobacco: Never Used   Substance and Sexual Activity   • Alcohol use: No     Alcohol/week: 0.0 standard drinks     Frequency: Never   • Drug use: No   • Sexual activity: Defer         CURRENT MEDICATIONS    Current Outpatient Medications:   •  atorvastatin (LIPITOR) 10 MG tablet, TAKE 1/2 (ONE-HALF) TABLET BY MOUTH ONCE DAILY IN THE EVENING, Disp: 45 tablet, Rfl: 2  •  clopidogrel (PLAVIX) 75 MG tablet, TAKE 1 TABLET BY MOUTH ONCE DAILY, Disp: 90 tablet, Rfl: 2  •  Multiple Vitamins-Minerals (MULTIVITAMIN WITH MINERALS) tablet tablet, Take 1 tablet by mouth Daily., Disp: , Rfl:     ALLERGIES  Tape    VITALS  Vitals:    10/02/20 1254   Height: 175.3 cm (69\")       LAST RESULTS     No results found.    PHYSICAL EXAM  Debilities/Disabilities Identified: None  Emotional Behavior: Appropriate  Physical Exam alert white male no active distress he is a 1.2 cm blister on his right medial foot there is no surrounding cellulitis.  Centrally appears slightly dark.  The site was prepped draped and the blister was unroofed.  He had clear serous fluid at the base the skin was slightly cyanotic about 1 cm.  It was not grossly " necrotic.  I think this is most consistent with a burn, the site was sterilely dressed in Silvadene.    ASSESSMENT  Burn      PLAN  Twice daily shower and Silvadene cream.  I will see him back in the office on Tuesday

## 2020-10-06 ENCOUNTER — OFFICE VISIT (OUTPATIENT)
Dept: SURGERY | Facility: CLINIC | Age: 70
End: 2020-10-06

## 2020-10-06 DIAGNOSIS — T30.0 BURN: Primary | ICD-10-CM

## 2020-10-06 PROCEDURE — 99212 OFFICE O/P EST SF 10 MIN: CPT | Performed by: SURGERY

## 2020-10-06 NOTE — PROGRESS NOTES
Follow up foot wound. Area improving.  There is no cellulitis.  He has been doing the dressing changes as we discussed.  Centrally there is full-thickness skin loss but I see no further evidence of any necrosis.  He was redressed with Silvadene today.  Continue as is and I will see him back in 2 weeks

## 2020-10-20 ENCOUNTER — OFFICE VISIT (OUTPATIENT)
Dept: SURGERY | Facility: CLINIC | Age: 70
End: 2020-10-20

## 2020-10-20 DIAGNOSIS — T14.8XXA OPEN WOUND: Primary | ICD-10-CM

## 2020-10-20 PROCEDURE — 99212 OFFICE O/P EST SF 10 MIN: CPT | Performed by: SURGERY

## 2020-10-20 NOTE — PROGRESS NOTES
2 week f/u burn on foot. Area improving.  He is doing the Silvadene dressing twice per day.  He thinks it is improving.  The wound has some necrotic debris centrally with some adherent exudate.  We cleaned the area with hydrogen peroxide Q-tip and was able to debride small area of this today.  Advised him to do this is a daily with hydrogen peroxide and the Q-tip then get in a shower then apply the Silvadene cream.  I will see him back in a week

## 2020-10-27 ENCOUNTER — OFFICE VISIT (OUTPATIENT)
Dept: SURGERY | Facility: CLINIC | Age: 70
End: 2020-10-27

## 2020-10-27 VITALS — BODY MASS INDEX: 27.4 KG/M2 | HEIGHT: 69 IN | WEIGHT: 185 LBS

## 2020-10-27 DIAGNOSIS — T14.8XXA OPEN WOUND: Primary | ICD-10-CM

## 2020-10-27 PROCEDURE — 99212 OFFICE O/P EST SF 10 MIN: CPT | Performed by: SURGERY

## 2020-10-27 NOTE — PROGRESS NOTES
Follow up foot burn. Area improving.  He has adhered exudate centrally that is not coming off with a local wound care.  Will need to sharply debride this in the office I will do that at the next available appointment he will need to be off his Plavix for 5 days prior to the procedure.  The wound was cleaned with hydroperoxide and a Q-tip and redressed with Silvadene in the office today there is no signs of infection

## 2020-11-10 ENCOUNTER — PROCEDURE VISIT (OUTPATIENT)
Dept: SURGERY | Facility: CLINIC | Age: 70
End: 2020-11-10

## 2020-11-10 DIAGNOSIS — T30.0 BURN: Primary | ICD-10-CM

## 2020-11-10 PROCEDURE — 16020 DRESS/DEBRID P-THICK BURN S: CPT | Performed by: SURGERY

## 2020-11-10 NOTE — PROGRESS NOTES
Debridement of foot ulcer  4.1cm x 2.5cm.  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  There is no evidence of cellulitis.  The site was prepped draped locally infiltrated 1% lidocaine with epinephrine.  A total 3-1/2 cc was used.  The area full-thickness loss was sharply excised.  We excised it down to the subcutaneous tissue.  The site was sterilely dressed.  He should continue the wound care with daily shower and Silvadene to start tomorrow.  He can resume his anticoagulation tomorrow.  I will see him back in the office in a week

## 2020-11-12 RX ORDER — CLOPIDOGREL BISULFATE 75 MG/1
75 TABLET ORAL DAILY
Qty: 90 TABLET | Refills: 1 | Status: SHIPPED | OUTPATIENT
Start: 2020-11-12 | End: 2021-02-23 | Stop reason: SDUPTHER

## 2020-11-16 ENCOUNTER — OFFICE VISIT (OUTPATIENT)
Dept: SURGERY | Facility: CLINIC | Age: 70
End: 2020-11-16

## 2020-11-16 VITALS — BODY MASS INDEX: 28.58 KG/M2 | TEMPERATURE: 97.8 F | HEIGHT: 69 IN | WEIGHT: 193 LBS

## 2020-11-16 DIAGNOSIS — T30.0 BURN: Primary | ICD-10-CM

## 2020-11-16 PROCEDURE — 99024 POSTOP FOLLOW-UP VISIT: CPT | Performed by: SURGERY

## 2020-11-16 NOTE — PROGRESS NOTES
PATIENT INFORMATION  Justo Castellano       - 1950    CHIEF COMPLAINT  Chief Complaint   Patient presents with   • Post-op Follow-up     rt foot ulcer       HISTORY OF PRESENT ILLNESS  HPI he feels well he feels like the wound is much improved.  He has been changing the dressing 2-3 times per day.    REVIEWED PERTINENT RESULTS/ LABS  Lab Results   Component Value Date    CASEREPORT  2019     Surgical Pathology Report                         Case: [id number]                                  Authorizing Provider:  Bryan Amor MD        Collected:           2019 09:35 AM          Ordering Location:     [facility]   Received:            2019 10:38 AM                                 OR                                                                           Pathologist:           Margarita Torres MD                                                    Specimen:    Abdominal Wall, debrided tissue , RLQ  wall                                                FINALDX  2019     1. Abdominal Wall, Right Lower Quadrant, Debridement:   A. Ulcer and soft tissue abscess.   B. Eosinophilia.   C. Negative for dysplasia and malignancy.    swm/pkm        Lab Results   Component Value Date    HGB 14.7 2019    MCV 94.2 2019     2019    ALT 21 2019    AST 51 (H) 2019    INR 1.05 2019    TRIG 100 2019      No results found.    REVIEW OF SYSTEMS  Review of Systems      ACTIVE PROBLEMS  Patient Active Problem List    Diagnosis   • Open abdominal wall wound [S31.109A]   • Essential hypertension [I10]   • Mixed hyperlipidemia [E78.2]   • NSTEMI (non-ST elevated myocardial infarction) (CMS/HCC) [I21.4]         PAST MEDICAL HISTORY  Past Medical History:   Diagnosis Date   • Antiplatelet or antithrombotic long-term use     Plavix   • Coronary artery disease 2019    stent placed   • Essential hypertension 3/22/2019   • GERD  (gastroesophageal reflux disease)    • Mixed hyperlipidemia 3/22/2019   • Myocardial infarct (CMS/HCC) 2019    Dr Rodriguez follows         SURGICAL HISTORY  Past Surgical History:   Procedure Laterality Date   • CARDIAC CATHETERIZATION N/A 2019    Procedure: Left Heart Cath;  Surgeon: Emmanuelle Good MD;  Location:  [facility];  Service: Cardiovascular   • CARDIAC CATHETERIZATION N/A 2019    Procedure: Coronary angiography;  Surgeon: Emmanuelle Good MD;  Location:  [facility];  Service: Cardiovascular   • CARDIAC CATHETERIZATION N/A 2019    Procedure: Left ventriculography;  Surgeon: Emmanuelle Good MD;  Location:  [facility];  Service: Cardiovascular   • CARDIAC CATHETERIZATION N/A 2019    Procedure: Stent ABDULKADIR coronary;  Surgeon: Emmanuelle Good MD;  Location:  [facility];  Service: Cardiovascular   • HAND SURGERY Right    • INCISION AND DRAINAGE ABSCESS      DR. AMOR - INSECT BITE - YEARS AGO   • [place] RT/LT HEART CATHETERS N/A 2019    Procedure: Percutaneous Coronary Intervention;  Surgeon: Emmanuelle Good MD;  Location:  [facility];  Service: Cardiovascular   • WOUND EXPLORATION TRUNK Right 2019    Procedure: excisional debridement skin and subcutaneous tissue right abdominal wall 5.1 x 2.3 x 2.4 cm;  Surgeon: Bryan Amor MD;  Location: [facility] [place];  Service: General         FAMILY HISTORY  Family History   Problem Relation Age of Onset   • Diabetes Sister    • Malig Hyperthermia Neg Hx          SOCIAL HISTORY  Social History     Occupational History   • Not on file   Tobacco Use   • Smoking status: Former Smoker     Packs/day: 0.50     Years: 40.00     Pack years: 20.00     Types: Cigarettes     Quit date: 2019     Years since quittin.7   • Smokeless tobacco: Never Used   Substance and Sexual Activity   • Alcohol use: No     Alcohol/week: 0.0 standard drinks     Frequency: Never   •  "Drug use: No   • Sexual activity: Defer         CURRENT MEDICATIONS    Current Outpatient Medications:   •  atorvastatin (LIPITOR) 10 MG tablet, TAKE 1/2 (ONE-HALF) TABLET BY MOUTH ONCE DAILY IN THE EVENING, Disp: 45 tablet, Rfl: 2  •  clopidogrel (PLAVIX) 75 MG tablet, Take 1 tablet by mouth Daily., Disp: 90 tablet, Rfl: 1  •  Multiple Vitamins-Minerals (MULTIVITAMIN WITH MINERALS) tablet tablet, Take 1 tablet by mouth Daily., Disp: , Rfl:     ALLERGIES  Tape    VITALS  Vitals:    11/16/20 1343   Temp: 97.8 °F (36.6 °C)   TempSrc: Temporal   Weight: 87.5 kg (193 lb)   Height: 175.3 cm (69.02\")       PHYSICAL EXAM  Debilities/Disabilities Identified: None  Emotional Behavior: Appropriate  Wt Readings from Last 3 Encounters:   11/16/20 87.5 kg (193 lb)   10/27/20 83.9 kg (185 lb)   08/11/20 83.9 kg (185 lb)     Ht Readings from Last 1 Encounters:   11/16/20 175.3 cm (69.02\")     Body mass index is 28.49 kg/m².  Physical Exam the wound is much  and is granulating.  It was redressed in the office today.    CLINICAL DATA REVIEWED       ASSESSMENT burn        PLAN  Decreasing his dressing change to once per day.  I will see him back in 2 weeks continue the Silvadene    I have discussed the above plan with the patient.  They verbalize understanding and are in agreement with the plan.  They have been advised to contact the office for any questions, concerns, or changes related to their health.                      "

## 2020-11-30 ENCOUNTER — OFFICE VISIT (OUTPATIENT)
Dept: SURGERY | Facility: CLINIC | Age: 70
End: 2020-11-30

## 2020-11-30 VITALS — DIASTOLIC BLOOD PRESSURE: 80 MMHG | SYSTOLIC BLOOD PRESSURE: 140 MMHG

## 2020-11-30 DIAGNOSIS — T14.8XXA OPEN WOUND: Primary | ICD-10-CM

## 2020-11-30 PROCEDURE — 99212 OFFICE O/P EST SF 10 MIN: CPT | Performed by: SURGERY

## 2020-11-30 NOTE — PROGRESS NOTES
2WK F/U ON RT FOOT ULCER- changing the dressing 1 time weekly. Patient doing well and reports no sign of infection.  The burn area has nearly healed it is much smaller its granulating and frederick and reepithelializing.  He is a new area that is consistent with pressure injury and he has got partial thickness loss of the skin and this measures about 1.5 x 1.5 cm and this is distal to the prior burn.  I recommended continued Silvadene on the burn he needs to clean the secondary wound with hydroperoxide and Q-tip twice daily and then Silvadene to the site and I will see him back in the office in 1 week.  If this requires debridement we will take him off his anticoagulant and do the debridement in the office on Friday

## 2020-12-07 ENCOUNTER — OFFICE VISIT (OUTPATIENT)
Dept: SURGERY | Facility: CLINIC | Age: 70
End: 2020-12-07

## 2020-12-07 DIAGNOSIS — T14.8XXA OPEN WOUND: Primary | ICD-10-CM

## 2020-12-07 PROCEDURE — 99211 OFF/OP EST MAY X REQ PHY/QHP: CPT | Performed by: SURGERY

## 2020-12-07 RX ORDER — INFLUENZA A VIRUS A/MICHIGAN/45/2015 X-275 (H1N1) ANTIGEN (FORMALDEHYDE INACTIVATED), INFLUENZA A VIRUS A/SINGAPORE/INFIMH-16-0019/2016 IVR-186 (H3N2) ANTIGEN (FORMALDEHYDE INACTIVATED), INFLUENZA B VIRUS B/PHUKET/3073/2013 ANTIGEN (FORMALDEHYDE INACTIVATED), AND INFLUENZA B VIRUS B/MARYLAND/15/2016 BX-69A ANTIGEN (FORMALDEHYDE INACTIVATED) 60; 60; 60; 60 UG/.7ML; UG/.7ML; UG/.7ML; UG/.7ML
INJECTION, SUSPENSION INTRAMUSCULAR
COMMUNITY
Start: 2020-10-24 | End: 2021-07-12

## 2020-12-07 NOTE — PROGRESS NOTES
4WK F/U ON RT FOOT ULCER- changing the dressing 2 time daily. Patient reports no signs of infection.  The burn wound is nearly sealed.  Is only open for about 10 mm.  The decubitus wound is slightly improved but still has a fair amount of adherent nonviable tissue.  He will stop his Plavix today and I will debride this in the office on Friday.

## 2020-12-11 ENCOUNTER — PROCEDURE VISIT (OUTPATIENT)
Dept: SURGERY | Facility: CLINIC | Age: 70
End: 2020-12-11

## 2020-12-11 VITALS
WEIGHT: 193 LBS | SYSTOLIC BLOOD PRESSURE: 140 MMHG | HEIGHT: 69 IN | BODY MASS INDEX: 28.58 KG/M2 | DIASTOLIC BLOOD PRESSURE: 72 MMHG

## 2020-12-11 DIAGNOSIS — L89.892 PRESSURE INJURY OF RIGHT FOOT, STAGE 2 (HCC): Primary | ICD-10-CM

## 2020-12-11 PROCEDURE — 99212 OFFICE O/P EST SF 10 MIN: CPT | Performed by: SURGERY

## 2020-12-11 NOTE — PROGRESS NOTES
PATIENT INFORMATION  Justo Castellano       - 1950    CHIEF COMPLAINT  Chief Complaint   Patient presents with   • Procedure     debridement procedure on rt foot       HISTORY OF PRESENT ILLNESS  HPI he is here today for debridement on the decubitus ulcer on his foot.  He complains of some mild burning at the site.  The burn area has healed        REVIEW OF SYSTEMS  Review of Systems   Constitutional: Negative for activity change, chills, fever and unexpected weight change.   HENT: Negative for congestion.    Eyes: Negative for visual disturbance.   Respiratory: Negative for shortness of breath.    Cardiovascular: Negative for chest pain and palpitations.   Gastrointestinal: Negative for abdominal pain and blood in stool.   Endocrine: Negative for cold intolerance and heat intolerance.   Genitourinary: Negative for hematuria.   Musculoskeletal: Negative for gait problem.   Skin: Negative for color change.   Allergic/Immunologic: Negative for immunocompromised state.   Neurological: Negative for weakness and light-headedness.   Hematological: Negative for adenopathy.   Psychiatric/Behavioral: Negative for sleep disturbance. The patient is not nervous/anxious.          ACTIVE PROBLEMS  Patient Active Problem List    Diagnosis   • Open abdominal wall wound [S31.109A]   • Essential hypertension [I10]   • Mixed hyperlipidemia [E78.2]   • NSTEMI (non-ST elevated myocardial infarction) (CMS/HCC) [I21.4]         PAST MEDICAL HISTORY  Past Medical History:   Diagnosis Date   • Antiplatelet or antithrombotic long-term use     Plavix   • Coronary artery disease 2019    stent placed   • Essential hypertension 3/22/2019   • GERD (gastroesophageal reflux disease)    • Mixed hyperlipidemia 3/22/2019   • Myocardial infarct (CMS/HCC) 2019    Dr Rodriguez follows         SURGICAL HISTORY  Past Surgical History:   Procedure Laterality Date   • CARDIAC CATHETERIZATION N/A 2019    Procedure: Left Heart Cath;   Surgeon: Emmanuelle Good MD;  Location:  [facility];  Service: Cardiovascular   • CARDIAC CATHETERIZATION N/A 2019    Procedure: Coronary angiography;  Surgeon: Emmanuelle Good MD;  Location:  [facility];  Service: Cardiovascular   • CARDIAC CATHETERIZATION N/A 2019    Procedure: Left ventriculography;  Surgeon: Emmanuelle Good MD;  Location:  [facility];  Service: Cardiovascular   • CARDIAC CATHETERIZATION N/A 2019    Procedure: Stent ABDULKADIR coronary;  Surgeon: Emmanuelle Good MD;  Location:  [facility];  Service: Cardiovascular   • HAND SURGERY Right    • INCISION AND DRAINAGE ABSCESS      DR. AMOR - INSECT BITE - YEARS AGO   • [place] RT/LT HEART CATHETERS N/A 2019    Procedure: Percutaneous Coronary Intervention;  Surgeon: Emmanuelle Good MD;  Location:  [facility];  Service: Cardiovascular   • WOUND EXPLORATION TRUNK Right 2019    Procedure: excisional debridement skin and subcutaneous tissue right abdominal wall 5.1 x 2.3 x 2.4 cm;  Surgeon: Bryan Amor MD;  Location: [facility] OR;  Service: General         FAMILY HISTORY  Family History   Problem Relation Age of Onset   • Diabetes Sister    • Malig Hyperthermia Neg Hx          SOCIAL HISTORY  Social History     Occupational History   • Not on file   Tobacco Use   • Smoking status: Former Smoker     Packs/day: 0.50     Years: 40.00     Pack years: 20.00     Types: Cigarettes     Quit date: 2019     Years since quittin.8   • Smokeless tobacco: Never Used   Substance and Sexual Activity   • Alcohol use: No     Alcohol/week: 0.0 standard drinks     Frequency: Never   • Drug use: No   • Sexual activity: Defer         CURRENT MEDICATIONS    Current Outpatient Medications:   •  atorvastatin (LIPITOR) 10 MG tablet, TAKE 1/2 (ONE-HALF) TABLET BY MOUTH ONCE DAILY IN THE EVENING, Disp: 45 tablet, Rfl: 2  •  clopidogrel (PLAVIX) 75 MG tablet, Take 1 tablet  "by mouth Daily., Disp: 90 tablet, Rfl: 1  •  Fluzone High-Dose Quadrivalent 0.7 ML suspension prefilled syringe, PHARMACIST ADMINISTERED IMMUNIZATION ADMINISTERED AT TIME OF DISPENSING, Disp: , Rfl:   •  Multiple Vitamins-Minerals (MULTIVITAMIN WITH MINERALS) tablet tablet, Take 1 tablet by mouth Daily., Disp: , Rfl:     ALLERGIES  Tape    VITALS  Vitals:    12/11/20 1231   BP: 140/72   BP Location: Left arm   Patient Position: Sitting   Weight: 87.5 kg (193 lb)   Height: 175.3 cm (69.02\")         PHYSICAL EXAM  Debilities/Disabilities Identified: None  Emotional Behavior: Appropriate  Physical Exam alert no active distress he does not have any allergy to lidocaine.  He stopped his Plavix Monday evening.  The wound is as before.  There is still some adherent necrotic tissue at the base.  The site was prepped and draped locally infiltrated with 1% lidocaine with epinephrine.  Skin was sharply debrided full-thickness.  We debrided it to healthier appearing tissue.  There was minimal bleeding.  The site was sterilely dressed.  He tolerated the procedure well.  A total of 8 cc of local anesthetic was used.    ASSESSMENT  Decubitus ulcer      PLAN  Elevate his foot is much as possible today regular Tylenol for discomfort.  He should start daily showers and Silvadene to the site starting tomorrow he can resume his Plavix tomorrow.  I will see him back on Monday.  I sent in a prescription for Silvadene because he said he was nearly out.  "

## 2020-12-14 ENCOUNTER — OFFICE VISIT (OUTPATIENT)
Dept: SURGERY | Facility: CLINIC | Age: 70
End: 2020-12-14

## 2020-12-14 DIAGNOSIS — T14.8XXA OPEN WOUND: Primary | ICD-10-CM

## 2020-12-14 PROCEDURE — 99211 OFF/OP EST MAY X REQ PHY/QHP: CPT | Performed by: SURGERY

## 2020-12-14 NOTE — PROGRESS NOTES
3 day p/o on pressure ulcer of right foot. Patient reports he is doing well and has no issues. No signs of infection.  He is back on his anticoagulation without problems.  The wound looks great.  It is much  and is granulating there is no signs of infection continue local wound care I will see him back in 2 weeks

## 2020-12-28 ENCOUNTER — OFFICE VISIT (OUTPATIENT)
Dept: SURGERY | Facility: CLINIC | Age: 70
End: 2020-12-28

## 2020-12-28 ENCOUNTER — TELEPHONE (OUTPATIENT)
Dept: CARDIOLOGY | Facility: CLINIC | Age: 70
End: 2020-12-28

## 2020-12-28 DIAGNOSIS — T14.8XXA OPEN WOUND: Primary | ICD-10-CM

## 2020-12-28 PROCEDURE — 99211 OFF/OP EST MAY X REQ PHY/QHP: CPT | Performed by: SURGERY

## 2020-12-28 NOTE — TELEPHONE ENCOUNTER
Patient would like to know if he would be a candidate for the Covid vaccine since he has a stent and is on blood thinner. Please call patient at .

## 2020-12-28 NOTE — PROGRESS NOTES
2 1/2 month f/u on ulcer on right foot. Patient states he is doing well.  He is without complaints.  The wound is almost healed.  It is reepithelialized and is only open for about 2 mm.  Continue local wound care and I will see him back in 1 week

## 2020-12-30 ENCOUNTER — TELEPHONE (OUTPATIENT)
Dept: CARDIOLOGY | Facility: CLINIC | Age: 70
End: 2020-12-30

## 2020-12-30 ENCOUNTER — OFFICE VISIT (OUTPATIENT)
Dept: SURGERY | Facility: CLINIC | Age: 70
End: 2020-12-30

## 2020-12-30 DIAGNOSIS — T14.8XXA OPEN WOUND: Primary | ICD-10-CM

## 2020-12-30 PROCEDURE — 99211 OFF/OP EST MAY X REQ PHY/QHP: CPT | Performed by: SURGERY

## 2020-12-30 NOTE — PROGRESS NOTES
Patient presents with a small mass on the right side of his back near the shoulder blade. Area is red with slight heat.   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  He denies any drainage from the site.  He has a 2 mm open area with a scab there is some surrounding induration but I see no evidence of cellulitis or abscess.  This is consistent with an open traumatic wound.  It should heal without any further intervention.  I will check this next time I check him for the wound on his foot as well.

## 2021-01-04 ENCOUNTER — OFFICE VISIT (OUTPATIENT)
Dept: SURGERY | Facility: CLINIC | Age: 71
End: 2021-01-04

## 2021-01-04 DIAGNOSIS — T14.8XXA OPEN WOUND: Primary | ICD-10-CM

## 2021-01-04 PROCEDURE — 99212 OFFICE O/P EST SF 10 MIN: CPT | Performed by: SURGERY

## 2021-01-04 NOTE — PROGRESS NOTES
appx 2 1/2 month f/u on rt foot open wound. 1 week f/u on small mass on right shoulder blade. Patient states that he is doing well.  His foot wound has sealed no further treatment is necessary on that.  His right upper back wound has progressed.  The area of skin necrosis has progressed from 1 to 2mm to 1 cm now there is surrounding induration but no evidence of cellulitis or abscess.  We will hold his anticoagulation beginning today and debride the wound on Friday.

## 2021-01-08 ENCOUNTER — PROCEDURE VISIT (OUTPATIENT)
Dept: SURGERY | Facility: CLINIC | Age: 71
End: 2021-01-08

## 2021-01-08 DIAGNOSIS — L98.9 SKIN LESION: Primary | ICD-10-CM

## 2021-01-08 PROCEDURE — 99212 OFFICE O/P EST SF 10 MIN: CPT | Performed by: SURGERY

## 2021-01-08 NOTE — PROGRESS NOTES
PATIENT INFORMATION  Justo Castellano       - 1950    CHIEF COMPLAINT  Chief Complaint   Patient presents with   • Procedure     In office procedure       HISTORY OF PRESENT ILLNESS  HPI his wound is as before.  He does have a 1 x 1 cm area of necrotic skin there is no induration the area of necrosis has not expanded since I last saw him on Monday.  He has been off his anticoagulation since Monday.        REVIEW OF SYSTEMS  Review of Systems   Constitutional: Negative for activity change, chills, fever and unexpected weight change.   HENT: Negative for congestion.    Eyes: Negative for visual disturbance.   Respiratory: Negative for shortness of breath.    Cardiovascular: Negative for chest pain and palpitations.   Gastrointestinal: Negative for abdominal pain and blood in stool.   Endocrine: Negative for cold intolerance and heat intolerance.   Genitourinary: Negative for hematuria.   Musculoskeletal: Negative for gait problem.   Skin: Negative for color change.   Allergic/Immunologic: Negative for immunocompromised state.   Neurological: Negative for weakness and light-headedness.   Hematological: Negative for adenopathy.   Psychiatric/Behavioral: Negative for sleep disturbance. The patient is not nervous/anxious.          ACTIVE PROBLEMS  Patient Active Problem List    Diagnosis   • Open abdominal wall wound [S31.109A]   • Essential hypertension [I10]   • Mixed hyperlipidemia [E78.2]   • NSTEMI (non-ST elevated myocardial infarction) (CMS/HCC) [I21.4]         PAST MEDICAL HISTORY  Past Medical History:   Diagnosis Date   • Antiplatelet or antithrombotic long-term use     Plavix   • Coronary artery disease 2019    stent placed   • Essential hypertension 3/22/2019   • GERD (gastroesophageal reflux disease)    • Mixed hyperlipidemia 3/22/2019   • Myocardial infarct (CMS/HCC) 2019    Dr Rodriguez follows         SURGICAL HISTORY  Past Surgical History:   Procedure Laterality Date   • CARDIAC  CATHETERIZATION N/A 2019    Procedure: Left Heart Cath;  Surgeon: Emmanuelle Good MD;  Location:  [facility];  Service: Cardiovascular   • CARDIAC CATHETERIZATION N/A 2019    Procedure: Coronary angiography;  Surgeon: Emmanuelle Good MD;  Location:  [facility];  Service: Cardiovascular   • CARDIAC CATHETERIZATION N/A 2019    Procedure: Left ventriculography;  Surgeon: Emmanuelle Good MD;  Location:  [facility];  Service: Cardiovascular   • CARDIAC CATHETERIZATION N/A 2019    Procedure: Stent ABDULKADIR coronary;  Surgeon: Emmanuelle Good MD;  Location:  [facility];  Service: Cardiovascular   • HAND SURGERY Right    • INCISION AND DRAINAGE ABSCESS      DR. AMOR - INSECT BITE - YEARS AGO   • [place] RT/LT HEART CATHETERS N/A 2019    Procedure: Percutaneous Coronary Intervention;  Surgeon: Emmanuelle Good MD;  Location:  [facility];  Service: Cardiovascular   • WOUND EXPLORATION TRUNK Right 2019    Procedure: excisional debridement skin and subcutaneous tissue right abdominal wall 5.1 x 2.3 x 2.4 cm;  Surgeon: Bryan Amor MD;  Location: [facility] OR;  Service: General         FAMILY HISTORY  Family History   Problem Relation Age of Onset   • Diabetes Sister    • Malig Hyperthermia Neg Hx          SOCIAL HISTORY  Social History     Occupational History   • Not on file   Tobacco Use   • Smoking status: Former Smoker     Packs/day: 0.50     Years: 40.00     Pack years: 20.00     Types: Cigarettes     Quit date: 2019     Years since quittin.9   • Smokeless tobacco: Never Used   Substance and Sexual Activity   • Alcohol use: No     Alcohol/week: 0.0 standard drinks     Frequency: Never   • Drug use: No   • Sexual activity: Defer         CURRENT MEDICATIONS    Current Outpatient Medications:   •  atorvastatin (LIPITOR) 10 MG tablet, TAKE 1/2 (ONE-HALF) TABLET BY MOUTH ONCE DAILY IN THE EVENING, Disp: 45  tablet, Rfl: 2  •  clopidogrel (PLAVIX) 75 MG tablet, Take 1 tablet by mouth Daily., Disp: 90 tablet, Rfl: 1  •  Fluzone High-Dose Quadrivalent 0.7 ML suspension prefilled syringe, PHARMACIST ADMINISTERED IMMUNIZATION ADMINISTERED AT TIME OF DISPENSING, Disp: , Rfl:   •  Multiple Vitamins-Minerals (MULTIVITAMIN WITH MINERALS) tablet tablet, Take 1 tablet by mouth Daily., Disp: , Rfl:   •  silver sulfadiazine (SILVADENE, SSD) 1 % cream, Apply to affected area daily, Disp: 50 g, Rfl: 1    ALLERGIES  Tape    VITALS  There were no vitals filed for this visit.    LAST RESULTS     No results found.    PHYSICAL EXAM  Debilities/Disabilities Identified: None  Emotional Behavior: Appropriate  Physical Exam the area of skin necrosis is unchanged.  1 x 1 cm.  There is no evidence of abscess or cellulitis.  The site was prepped draped locally infiltrated with 1% lidocaine with epinephrine a total of 3 cc was used.  The skin was excised full-thickness to normal-appearing skin and viable appearing tissue circumferentially and deep.  There was minimal bleeding.  The site was sterilely dressed with dry gauze.  He tolerated the procedure well    ASSESSMENT  Necrotic skin      PLAN  He can resume his anticoagulation in the a.m.  Regular Tylenol for the discomfort.  In the a.m. start daily shower and Silvadene cream to the site.  I will see him back in the office next week

## 2021-01-11 ENCOUNTER — OFFICE VISIT (OUTPATIENT)
Dept: SURGERY | Facility: CLINIC | Age: 71
End: 2021-01-11

## 2021-01-11 DIAGNOSIS — T14.8XXA OPEN WOUND: Primary | ICD-10-CM

## 2021-01-11 PROCEDURE — 99212 OFFICE O/P EST SF 10 MIN: CPT | Performed by: SURGERY

## 2021-01-11 NOTE — PROGRESS NOTES
3 day po on open wound. Patient reports he showers 2 times daily and changes the dressing 2 times daily. He has no other complaints.  He is without complaints he is status post debridement of his back wound.  It is clean without any necrotic debris there is no cellulitis or abscess.  He was redressed and Silvadene today.  He is getting some tape issues and we changed him to a large Band-Aid.  His foot wound remains healed.  Continue twice daily showers and I will see him back in 2 weeks

## 2021-01-26 ENCOUNTER — OFFICE VISIT (OUTPATIENT)
Dept: SURGERY | Facility: CLINIC | Age: 71
End: 2021-01-26

## 2021-01-26 DIAGNOSIS — T14.8XXA OPEN WOUND: Primary | ICD-10-CM

## 2021-01-26 PROCEDURE — 99211 OFF/OP EST MAY X REQ PHY/QHP: CPT | Performed by: SURGERY

## 2021-01-26 NOTE — PROGRESS NOTES
F/u wound on back. Area resolved.  Wounds have sealed there is no evidence of infection.  He had several questions about wound care.  The wound on his foot has some dry areas and I have recommended a moisturizing cream or lotion daily or twice daily depending on his results.  I will see him back as needed

## 2021-02-05 ENCOUNTER — OFFICE VISIT (OUTPATIENT)
Dept: CARDIOLOGY | Facility: CLINIC | Age: 71
End: 2021-02-05

## 2021-02-05 VITALS
HEART RATE: 69 BPM | BODY MASS INDEX: 29.04 KG/M2 | HEIGHT: 69 IN | SYSTOLIC BLOOD PRESSURE: 190 MMHG | WEIGHT: 196.1 LBS | DIASTOLIC BLOOD PRESSURE: 100 MMHG

## 2021-02-05 DIAGNOSIS — E78.2 MIXED HYPERLIPIDEMIA: ICD-10-CM

## 2021-02-05 DIAGNOSIS — I10 ESSENTIAL HYPERTENSION: ICD-10-CM

## 2021-02-05 DIAGNOSIS — I21.4 NSTEMI (NON-ST ELEVATED MYOCARDIAL INFARCTION) (HCC): Primary | ICD-10-CM

## 2021-02-05 PROCEDURE — 93000 ELECTROCARDIOGRAM COMPLETE: CPT | Performed by: INTERNAL MEDICINE

## 2021-02-05 PROCEDURE — 99214 OFFICE O/P EST MOD 30 MIN: CPT | Performed by: INTERNAL MEDICINE

## 2021-02-05 RX ORDER — LOSARTAN POTASSIUM 50 MG/1
50 TABLET ORAL NIGHTLY
Qty: 90 TABLET | Refills: 3 | Status: SHIPPED | OUTPATIENT
Start: 2021-02-05 | End: 2022-01-27 | Stop reason: SDUPTHER

## 2021-02-05 NOTE — PROGRESS NOTES
Date of Office Visit: 2021  Encounter Provider: Lizz Rodriguez MD  Place of Service: [facility] CARDIOLOGY  Patient Name: Justo Castellano  :1950      Patient ID:  Justo Castellano is a 70 y.o. male is here for  followup for CAD.         History of Present Illness    He presented to the [facility] ER on 19 with chest burning and had an unremarkable EKG but a troponin of 0.231.   He was transferred to [facility] where he had a cardiac catheterization for NSTEMI.   He underwent stenting of OM2 receiving 2.5 x 28 mm Xience ABDULKADIR stent.  This caused 90% stenosis in the circumflex which was then opened with kissing balloon technique into circumflex and OM 2.     Echo done 3/4/19 showed ejection fraction 60-65% with grade 1 diastolic dysfunction and mild mitral insufficiency.  He had a treadmill stress study exercising on a Mike protocol for 8 minutes done 3/4/19.  This showed no acute ST changes.  PVCs were noted with exercise.  Patient was hypertensive with exercise.     He has hypertension and hyperlipidemia.      Echo done 3/4/2019 showed ejection fraction 61-65% with grade 1 diastolic dysfunction and mild mitral insufficiency.  He had no ischemic ECG changes noted with a treadmill stress study done 3/4/2019.  PVCs were noted during exercise.  This was all done in response to chest pain.  He was hypertensive with exercise.     Plan 2021 show normal CMP, normal CBC, total cholesterol 46, triglycerides 144, HDL 44, LDL 77, hemoglobin A1c 5.7, normal PSA.    Walks 2 to 3 miles daily.  He has no chest pain or pressure.  He has no dizziness or syncope.  He has had no orthopnea or PND.  He has no exertional dyspnea.  He continues to drive bus.  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  He has had no difficulty with medications.   He is taking them as directed.  Checks his blood pressure at home and has been running 140-150/80.    Past Medical History:   Diagnosis Date   • Antiplatelet or antithrombotic long-term use     Plavix   • Coronary artery disease 02/2019    stent placed   • Essential hypertension 3/22/2019   • GERD (gastroesophageal reflux disease)    • Mixed hyperlipidemia 3/22/2019   • Myocardial infarct (CMS/HCC) 02/2019    Dr Rodriguez follows         Past Surgical History:   Procedure Laterality Date   • CARDIAC CATHETERIZATION N/A 2/8/2019    Procedure: Left Heart Cath;  Surgeon: Emmanuelle Good MD;  Location: [facility] CATH INVASIVE LOCATION;  Service: Cardiovascular   • CARDIAC CATHETERIZATION N/A 2/8/2019    Procedure: Coronary angiography;  Surgeon: Emmanuelle Good MD;  Location: [facility] CATH INVASIVE LOCATION;  Service: Cardiovascular   • CARDIAC CATHETERIZATION N/A 2/8/2019    Procedure: Left ventriculography;  Surgeon: Emmanuelle Good MD;  Location: [facility] CATH INVASIVE LOCATION;  Service: Cardiovascular   • CARDIAC CATHETERIZATION N/A 2/8/2019    Procedure: Stent ABDULKADIR coronary;  Surgeon: Emmanuelle Good MD;  Location: [facility] CATH INVASIVE LOCATION;  Service: Cardiovascular   • HAND SURGERY Right    • INCISION AND DRAINAGE ABSCESS      DR. AMOR - INSECT BITE - YEARS AGO   • ID RT/LT HEART CATHETERS N/A 2/8/2019    Procedure: Percutaneous Coronary Intervention;  Surgeon: Emmanuelle Good MD;  Location: [facility] INVASIVE LOCATION;  Service: Cardiovascular   • WOUND EXPLORATION TRUNK Right 6/26/2019    Procedure: excisional debridement skin and subcutaneous tissue right abdominal wall 5.1 x 2.3 x 2.4 cm;  Surgeon: Bryan Amor MD;  Location: [facility];  Service: General       Current Outpatient Medications on File Prior to Visit   Medication Sig Dispense Refill   • atorvastatin (LIPITOR) 10 MG tablet TAKE 1/2 (ONE-HALF) TABLET BY MOUTH ONCE DAILY IN THE EVENING 45 tablet 2   • clopidogrel (PLAVIX) 75 MG tablet Take 1  "tablet by mouth Daily. 90 tablet 1   • Fluzone High-Dose Quadrivalent 0.7 ML suspension prefilled syringe PHARMACIST ADMINISTERED IMMUNIZATION ADMINISTERED AT TIME OF DISPENSING     • Multiple Vitamins-Minerals (MULTIVITAMIN WITH MINERALS) tablet tablet Take 1 tablet by mouth Daily.     • silver sulfadiazine (SILVADENE, SSD) 1 % cream Apply to affected area daily 50 g 1     No current facility-administered medications on file prior to visit.        Social History     Socioeconomic History   • Marital status:      Spouse name: Not on file   • Number of children: Not on file   • Years of education: Not on file   • Highest education level: Not on file   Tobacco Use   • Smoking status: Former Smoker     Packs/day: 0.50     Years: 40.00     Pack years: 20.00     Types: Cigarettes     Quit date: 2019     Years since quittin.0   • Smokeless tobacco: Never Used   Substance and Sexual Activity   • Alcohol use: No     Alcohol/week: 0.0 standard drinks     Frequency: Never     Comment: Caffeine use: 1 cup daily   • Drug use: No   • Sexual activity: Defer           ROS    Procedures    ECG 12 Lead    Date/Time: 2021 9:35 AM  Performed by: Lizz Rodriguez MD  Authorized by: Lizz Rodriguez MD   Comparison: compared with previous ECG   Similar to previous ECG  Rhythm: sinus rhythm    Clinical impression: normal ECG                Objective:      Vitals:    21 0926   BP: (!) 190/100   BP Location: Right arm   Pulse: 69   Weight: 89 kg (196 lb 1.6 oz)   Height: 175.3 cm (69\")     Body mass index is 28.96 kg/m².    Vitals signs reviewed.   Constitutional:       General: Not in acute distress.     Appearance: Well-developed. Not diaphoretic.   Eyes:      General: No scleral icterus.     Conjunctiva/sclera: Conjunctivae normal.   HENT:      Head: Normocephalic and atraumatic.   Neck:      Musculoskeletal: Neck supple.      Thyroid: No thyromegaly.      Vascular: No carotid bruit or JVD.      " Lymphadenopathy: No cervical adenopathy.   Pulmonary:      Effort: Pulmonary effort is normal. No respiratory distress.      Breath sounds: Normal breath sounds. No wheezing. No rhonchi. No rales.   Chest:      Chest wall: Not tender to palpatation.   Cardiovascular:      Normal rate. Regular rhythm.      Murmurs: There is no murmur.      No gallop. No rub.   Edema:     Peripheral edema absent.   Abdominal:      General: Bowel sounds are normal. There is no distension or abdominal bruit.      Palpations: Abdomen is soft. There is no abdominal mass.      Tenderness: There is no abdominal tenderness.   Musculoskeletal:         General: No deformity.      Extremities: No clubbing present.  Skin:     General: Skin is warm and dry. There is no cyanosis.      Coloration: Skin is not pale.      Findings: No rash.   Neurological:      Mental Status: Alert and oriented to person, place, and time.      Cranial Nerves: No cranial nerve deficit.   Psychiatric:         Judgment: Judgment normal.         Lab Review:       Assessment:      Diagnosis Plan   1. NSTEMI (non-ST elevated myocardial infarction) (CMS/HCC)     2. Essential hypertension     3. Mixed hyperlipidemia       1. CAD, s/p OM2 stent. No angina.   2. Hyperlipidemia, on atorvastatin.   3. Retention, goal less than 120/80, start losartan 50 mg daily.  4. .         Plan:       Blood Pressure was very high here today but is also running high at home.  We will start losartan 50 mg daily but no other testing at this time, see Serge in 6 months.

## 2021-02-23 RX ORDER — CLOPIDOGREL BISULFATE 75 MG/1
75 TABLET ORAL DAILY
Qty: 90 TABLET | Refills: 3 | Status: SHIPPED | OUTPATIENT
Start: 2021-02-23 | End: 2022-05-17

## 2021-02-23 RX ORDER — ATORVASTATIN CALCIUM 10 MG/1
TABLET, FILM COATED ORAL
Qty: 45 TABLET | Refills: 3 | Status: SHIPPED | OUTPATIENT
Start: 2021-02-23 | End: 2021-07-23

## 2021-07-12 ENCOUNTER — OFFICE VISIT (OUTPATIENT)
Dept: CARDIOLOGY | Facility: CLINIC | Age: 71
End: 2021-07-12

## 2021-07-12 VITALS
HEIGHT: 69 IN | OXYGEN SATURATION: 97 % | HEART RATE: 73 BPM | WEIGHT: 188 LBS | SYSTOLIC BLOOD PRESSURE: 140 MMHG | DIASTOLIC BLOOD PRESSURE: 80 MMHG | BODY MASS INDEX: 27.85 KG/M2 | RESPIRATION RATE: 16 BRPM

## 2021-07-12 DIAGNOSIS — I25.10 CORONARY ARTERY DISEASE INVOLVING NATIVE CORONARY ARTERY OF NATIVE HEART WITHOUT ANGINA PECTORIS: ICD-10-CM

## 2021-07-12 DIAGNOSIS — I10 ESSENTIAL HYPERTENSION: Primary | ICD-10-CM

## 2021-07-12 DIAGNOSIS — E78.2 MIXED HYPERLIPIDEMIA: ICD-10-CM

## 2021-07-12 DIAGNOSIS — I21.4 NSTEMI (NON-ST ELEVATED MYOCARDIAL INFARCTION) (HCC): ICD-10-CM

## 2021-07-12 PROCEDURE — 93000 ELECTROCARDIOGRAM COMPLETE: CPT | Performed by: NURSE PRACTITIONER

## 2021-07-12 PROCEDURE — 99214 OFFICE O/P EST MOD 30 MIN: CPT | Performed by: NURSE PRACTITIONER

## 2021-07-12 NOTE — PROGRESS NOTES
Date of Office Visit: 2021  Encounter Provider: CHRIS Duvall  Place of Service: [facility] CARDIOLOGY  Patient Name: Justo Castellano  :1950  Primary Cardiologist: Dr. Rodriguez    CC:  6 month follow up     Dear Cinda    HPI: Justo Castellano is a pleasant 71 y.o. male who presents 2021 for cardiac follow up.  I have reviewed his past medical records including notes, labs and testing I preparation for today's visit.He has hypertension and hyperlipidemia.     He presented to the [facility] ER on 19 with chest burning and had an unremarkable EKG but a troponin of 0.231.   He was transferred to [facility] where he had a cardiac catheterization for NSTEMI.   He underwent stenting of OM2 receiving 2.5 x 28 mm Xience ABDULKADIR stent.  This caused 90% stenosis in the circumflex which was then opened with kissing balloon technique into circumflex and OM 2.     Echo done 3/4/19 showed ejection fraction 60-65% with grade 1 diastolic dysfunction and mild mitral insufficiency.  He had a treadmill stress study exercising on a Mike protocol for 8 minutes done 3/4/19.  This showed no acute ST changes.  PVCs were noted with exercise.  Patient was hypertensive with exercise.      Echo done 3/4/2019 showed ejection fraction 61-65% with grade 1 diastolic dysfunction and mild mitral insufficiency.  He had no ischemic ECG changes noted with a treadmill stress study done 3/4/2019.  PVCs were noted during exercise.  This was all done in response to chest pain.  He was hypertensive with exercise.     He had labs performed 2021 that showed unremarkable CBC and a CMP.  His creatinine is 1.07.  His ALT and AST are in normal limits.  He is total cholesterol 138, triglycerides 96, HDL 42, LDL 78.  His A1c is 5.7.     He is here today in follow-up.  He states he feels great.  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  He denies any palpitations, shortness of breath, lower extremity edema.  He is not had any dizziness or lightheadedness.  He denies any chest pain or chest pressure.  He denies any fatigue.  He does state that he has been having some unexplained lower extremity nighttime cramping.  He is on atorvastatin and takes 5 mg every night.     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     Past Medical History:   Diagnosis Date   • Antiplatelet or antithrombotic long-term use     Plavix   • Coronary artery disease 02/2019    stent placed   • Essential hypertension 3/22/2019   • GERD (gastroesophageal reflux disease)    • Mixed hyperlipidemia 3/22/2019   • Myocardial infarct (CMS/HCC) 02/2019    Dr Rodriguez follows       Past Surgical History:   Procedure Laterality Date   • CARDIAC CATHETERIZATION N/A 2/8/2019    Procedure: Left Heart Cath;  Surgeon: Emmanuelle Good MD;  Location: [facility] CATH INVASIVE LOCATION;  Service: Cardiovascular   • CARDIAC CATHETERIZATION N/A 2/8/2019    Procedure: Coronary angiography;  Surgeon: Emmanuelle Good MD;  Location: [facility] CATH INVASIVE LOCATION;  Service: Cardiovascular   • CARDIAC CATHETERIZATION N/A 2/8/2019    Procedure: Left ventriculography;  Surgeon: Emmanuelle Good MD;  Location:  [facility] CATH INVASIVE LOCATION;  Service: Cardiovascular   • CARDIAC CATHETERIZATION N/A 2/8/2019    Procedure: Stent ABDULKADIR coronary;  Surgeon: Emmanuelle Good MD;  Location:  [facility] CATH INVASIVE LOCATION;  Service: Cardiovascular   • HAND SURGERY Right    • INCISION AND DRAINAGE ABSCESS      DR. RIVERA - INSECT BITE - YEARS AGO   • MN RT/LT HEART CATHETERS N/A 2/8/2019    Procedure: Percutaneous Coronary Intervention;  Surgeon: Emmanuelle Good MD;  Location:  [facility] CATH INVASIVE LOCATION;  Service: Cardiovascular   • WOUND EXPLORATION TRUNK Right 6/26/2019    Procedure: excisional debridement skin and subcutaneous tissue right abdominal wall 5.1 x 2.3 x 2.4 cm;  Surgeon:  Bryan Amor MD;  Location: [facility] OR;  Service: General       Social History     Socioeconomic History   • Marital status:      Spouse name: Not on file   • Number of children: Not on file   • Years of education: Not on file   • Highest education level: Not on file   Tobacco Use   • Smoking status: Former Smoker     Packs/day: 0.50     Years: 40.00     Pack years: 20.00     Types: Cigarettes     Quit date: 2019     Years since quittin.4   • Smokeless tobacco: Never Used   Substance and Sexual Activity   • Alcohol use: No     Alcohol/week: 0.0 standard drinks     Comment: Caffeine use: 1 cup daily   • Drug use: No   • Sexual activity: Defer       Family History   Problem Relation Age of Onset   • Diabetes Sister    • Malig Hyperthermia Neg Hx        The following portion of the patient's history were reviewed and updated as appropriate: past medical history, past surgical history, past social history, past family history, allergies, current medications, and problem list.    Review of Systems   Constitutional: Negative for diaphoresis, fever and malaise/fatigue.   HENT: Negative for congestion, hearing loss, hoarse voice, nosebleeds and sore throat.    Eyes: Negative for photophobia, vision loss in left eye, vision loss in right eye and visual disturbance.   Cardiovascular: Negative for chest pain, dyspnea on exertion, irregular heartbeat, leg swelling, near-syncope, orthopnea, palpitations, paroxysmal nocturnal dyspnea and syncope.   Respiratory: Negative for cough, hemoptysis, shortness of breath, sleep disturbances due to breathing, snoring, sputum production and wheezing.    Endocrine: Negative for cold intolerance, heat intolerance, polydipsia, polyphagia and polyuria.   Hematologic/Lymphatic: Negative for bleeding problem. Does not bruise/bleed easily.   Skin: Negative for color change, dry skin, poor wound healing, rash and suspicious lesions.   Musculoskeletal: Positive for muscle cramps.  "Negative for arthritis, back pain, falls, gout, joint pain, joint swelling, muscle weakness and myalgias.   Gastrointestinal: Negative for bloating, abdominal pain, constipation, diarrhea, dysphagia, melena, nausea and vomiting.   Neurological: Negative for excessive daytime sleepiness, dizziness, headaches, light-headedness, loss of balance, numbness, paresthesias, seizures, vertigo and weakness.   Psychiatric/Behavioral: Negative for depression, memory loss and substance abuse. The patient is not nervous/anxious.        Allergies   Allergen Reactions   • Tape Rash     Plastic or silk/fabric tape         Current Outpatient Medications:   •  atorvastatin (LIPITOR) 10 MG tablet, TAKE 1/2 (ONE-HALF) TABLET BY MOUTH ONCE DAILY IN THE EVENING, Disp: 45 tablet, Rfl: 3  •  clopidogrel (PLAVIX) 75 MG tablet, Take 1 tablet by mouth Daily., Disp: 90 tablet, Rfl: 3  •  losartan (Cozaar) 50 MG tablet, Take 1 tablet by mouth Every Night., Disp: 90 tablet, Rfl: 3  •  Multiple Vitamins-Minerals (MULTIVITAMIN WITH MINERALS) tablet tablet, Take 1 tablet by mouth Daily., Disp: , Rfl:         Objective:     Vitals:    07/12/21 1016 07/12/21 1035   BP: 160/80 140/80   Pulse: 73    Resp: 16    SpO2: 97%    Weight: 85.3 kg (188 lb)    Height: 175.3 cm (69\")      Body mass index is 27.76 kg/m².      Vitals reviewed.   Constitutional:       General: Not in acute distress.     Appearance: Healthy appearance. Well-developed.   Eyes:      General:         Right eye: No discharge.         Left eye: No discharge.      Conjunctiva/sclera: Conjunctivae normal.   HENT:      Head: Normocephalic and atraumatic.      Right Ear: External ear normal.      Left Ear: External ear normal.      Nose: Nose normal.   Neck:      Thyroid: No thyromegaly.      Vascular: No JVD.      Trachea: No tracheal deviation.      Lymphadenopathy: No cervical adenopathy.   Pulmonary:      Effort: Pulmonary effort is normal. No respiratory distress.      Breath sounds: " Normal breath sounds. No wheezing. No rales.   Chest:      Chest wall: Not tender to palpatation.   Cardiovascular:      Normal rate. Regular rhythm.      No gallop.   Pulses:     Intact distal pulses.   Edema:     Peripheral edema absent.   Abdominal:      General: There is no distension.      Palpations: Abdomen is soft.      Tenderness: There is no abdominal tenderness.   Musculoskeletal: Normal range of motion.         General: No tenderness or deformity.      Cervical back: Normal range of motion and neck supple. Skin:     General: Skin is warm and dry.      Findings: No erythema or rash.   Neurological:      Mental Status: Alert and oriented to person, place, and time.      Coordination: Coordination normal.   Psychiatric:         Attention and Perception: Attention normal.         Mood and Affect: Mood normal.         Speech: Speech normal.         Behavior: Behavior normal.         Thought Content: Thought content normal.         Cognition and Memory: Cognition normal.         Judgment: Judgment normal.               ECG 12 Lead    Date/Time: 7/12/2021 10:28 AM  Performed by: Olga Valdez APRN  Authorized by: Olga Valdez APRN   Comparison: compared with previous ECG from 2/5/2021  Similar to previous ECG  Rhythm: sinus rhythm  Rate: normal  Conduction: conduction normal  ST Depression: III, aVF, V4 and V5 (minimal)  T inversion: III  QRS axis: normal    Clinical impression: non-specific ECG              Assessment:       Diagnosis Plan   1. Essential hypertension     2. Coronary artery disease involving native coronary artery of native heart without angina pectoris  Treadmill Stress Test   3. NSTEMI (non-ST elevated myocardial infarction) (CMS/[facility])  Treadmill Stress Test   4. Mixed hyperlipidemia            Plan:       1. CAD, s/p OM2 stent. - denies angina  2. Hyperlipidemia - taking 5 mg of atorvastatin daily.  He has been having intermittent LE muscle aches at night for the last year.  Worse in the  "last several weeks.  I  Am having him stop his Lipitor for 2 weeks and call.  If his symptoms resolve, would suggest a water soluble statin.  3. Retention, goal less than 120/80 - on recheck 140/80.  States his home Bps are \"always good\".  Just gets very nervous in MD office.  4. .    5.  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    As always, it has been a pleasure to participate in your patient's care. Thank you.       Sincerely,       CHRIS Duvall      Current Outpatient Medications:   •  atorvastatin (LIPITOR) 10 MG tablet, TAKE 1/2 (ONE-HALF) TABLET BY MOUTH ONCE DAILY IN THE EVENING, Disp: 45 tablet, Rfl: 3  •  clopidogrel (PLAVIX) 75 MG tablet, Take 1 tablet by mouth Daily., Disp: 90 tablet, Rfl: 3  •  losartan (Cozaar) 50 MG tablet, Take 1 tablet by mouth Every Night., Disp: 90 tablet, Rfl: 3  •  Multiple Vitamins-Minerals (MULTIVITAMIN WITH MINERALS) tablet tablet, Take 1 tablet by mouth Daily., Disp: , Rfl:     Dictated utilizing Dragon dictation        "

## 2021-07-23 ENCOUNTER — HOSPITAL ENCOUNTER (OUTPATIENT)
Dept: CARDIOLOGY | Facility: HOSPITAL | Age: 71
Discharge: HOME | End: 2021-07-23
Admitting: NURSE PRACTITIONER

## 2021-07-23 DIAGNOSIS — E78.2 MIXED HYPERLIPIDEMIA: Primary | ICD-10-CM

## 2021-07-23 DIAGNOSIS — I21.4 NSTEMI (NON-ST ELEVATED MYOCARDIAL INFARCTION) (HCC): ICD-10-CM

## 2021-07-23 DIAGNOSIS — I25.10 CORONARY ARTERY DISEASE INVOLVING NATIVE CORONARY ARTERY OF NATIVE HEART WITHOUT ANGINA PECTORIS: ICD-10-CM

## 2021-07-23 LAB
BH CV STRESS BP STAGE 1: NORMAL
BH CV STRESS BP STAGE 2: NORMAL
BH CV STRESS BP STAGE 3: NORMAL
BH CV STRESS DURATION MIN STAGE 1: 3
BH CV STRESS DURATION MIN STAGE 2: 3
BH CV STRESS DURATION MIN STAGE 3: 3
BH CV STRESS DURATION SEC STAGE 1: 0
BH CV STRESS DURATION SEC STAGE 2: 0
BH CV STRESS DURATION SEC STAGE 3: 0
BH CV STRESS GRADE STAGE 1: 10
BH CV STRESS GRADE STAGE 2: 12
BH CV STRESS GRADE STAGE 3: 14
BH CV STRESS HR STAGE 1: 92
BH CV STRESS HR STAGE 2: 112
BH CV STRESS HR STAGE 3: 135
BH CV STRESS METS STAGE 1: 5
BH CV STRESS METS STAGE 2: 7.5
BH CV STRESS METS STAGE 3: 10
BH CV STRESS PROTOCOL 1: NORMAL
BH CV STRESS RECOVERY BP: NORMAL MMHG
BH CV STRESS RECOVERY HR: 76 BPM
BH CV STRESS SPEED STAGE 1: 1.7
BH CV STRESS SPEED STAGE 2: 2.5
BH CV STRESS SPEED STAGE 3: 3.4
BH CV STRESS STAGE 1: 1
BH CV STRESS STAGE 2: 2
BH CV STRESS STAGE 3: 3
MAXIMAL PREDICTED HEART RATE: 151 BPM
PERCENT MAX PREDICTED HR: 89.4 %
STRESS BASELINE BP: NORMAL MMHG
STRESS BASELINE HR: 72 BPM
STRESS O2 SAT REST: 98 %
STRESS PERCENT HR: 105 %
STRESS POST ESTIMATED WORKLOAD: 10.2 METS
STRESS POST EXERCISE DUR MIN: 9 MIN
STRESS POST EXERCISE DUR SEC: 1 SEC
STRESS POST O2 SAT PEAK: 98 %
STRESS POST PEAK BP: NORMAL MMHG
STRESS POST PEAK HR: 135 BPM
STRESS TARGET HR: 128 BPM

## 2021-07-23 PROCEDURE — 93018 CV STRESS TEST I&R ONLY: CPT | Performed by: INTERNAL MEDICINE

## 2021-07-23 PROCEDURE — 93016 CV STRESS TEST SUPVJ ONLY: CPT | Performed by: INTERNAL MEDICINE

## 2021-07-23 PROCEDURE — 93017 CV STRESS TEST TRACING ONLY: CPT

## 2021-07-23 RX ORDER — ROSUVASTATIN CALCIUM 5 MG/1
5 TABLET, COATED ORAL DAILY
Qty: 30 TABLET | Refills: 11 | Status: SHIPPED | OUTPATIENT
Start: 2021-07-23 | End: 2022-07-11

## 2021-08-12 ENCOUNTER — OFFICE VISIT (OUTPATIENT)
Dept: SURGERY | Facility: CLINIC | Age: 71
End: 2021-08-12

## 2021-08-12 VITALS — DIASTOLIC BLOOD PRESSURE: 82 MMHG | WEIGHT: 185 LBS | SYSTOLIC BLOOD PRESSURE: 160 MMHG | BODY MASS INDEX: 27.32 KG/M2

## 2021-08-12 DIAGNOSIS — L89.892 PRESSURE INJURY OF RIGHT FOOT, STAGE 2 (HCC): Primary | ICD-10-CM

## 2021-08-12 PROCEDURE — 99213 OFFICE O/P EST LOW 20 MIN: CPT | Performed by: SURGERY

## 2021-08-12 NOTE — PROGRESS NOTES
PATIENT INFORMATION  Justo Castellano       - 1950    CHIEF COMPLAINT  Chief Complaint   Patient presents with   • Follow-up     rt foot blister inside foot       HISTORY OF PRESENT ILLNESS  HPI complains of a several week history of of a blister on his right medial foot.  He said he got it after walking in his boot.  He denies any fevers or chills he tried to braden the area but was unsuccessful.        REVIEW OF SYSTEMS  Review of Systems   Constitutional: Negative for activity change, chills, fever and unexpected weight change.   HENT: Negative for congestion.    Eyes: Negative for visual disturbance.   Respiratory: Negative for shortness of breath.    Cardiovascular: Negative for chest pain and palpitations.   Gastrointestinal: Negative for abdominal pain and blood in stool.   Endocrine: Negative for cold intolerance and heat intolerance.   Genitourinary: Negative for hematuria.   Musculoskeletal: Negative for gait problem.   Skin: Positive for wound. Negative for color change.   Allergic/Immunologic: Negative for immunocompromised state.   Neurological: Negative for weakness and light-headedness.   Hematological: Negative for adenopathy.   Psychiatric/Behavioral: Negative for sleep disturbance. The patient is not nervous/anxious.          ACTIVE PROBLEMS  Patient Active Problem List    Diagnosis    • Open abdominal wall wound [S31.109A]    • Essential hypertension [I10]    • Mixed hyperlipidemia [E78.2]    • NSTEMI (non-ST elevated myocardial infarction) (CMS/HCC) [I21.4]          PAST MEDICAL HISTORY  Past Medical History:   Diagnosis Date   • Antiplatelet or antithrombotic long-term use     Plavix   • Coronary artery disease 2019    stent placed   • Essential hypertension 3/22/2019   • GERD (gastroesophageal reflux disease)    • Mixed hyperlipidemia 3/22/2019   • Myocardial infarct (CMS/HCC) 2019    Dr Rodriguez follows         SURGICAL HISTORY  Past Surgical History:   Procedure  Laterality Date   • CARDIAC CATHETERIZATION N/A 2019    Procedure: Left Heart Cath;  Surgeon: Emmanuelle Good MD;  Location:  [facility];  Service: Cardiovascular   • CARDIAC CATHETERIZATION N/A 2019    Procedure: Coronary angiography;  Surgeon: Emmanuelle Good MD;  Location:  [facility];  Service: Cardiovascular   • CARDIAC CATHETERIZATION N/A 2019    Procedure: Left ventriculography;  Surgeon: Emmanuelle Good MD;  Location:  [facility];  Service: Cardiovascular   • CARDIAC CATHETERIZATION N/A 2019    Procedure: Stent ABDULKADIR coronary;  Surgeon: Emmanuelle Good MD;  Location:  [facility];  Service: Cardiovascular   • HAND SURGERY Right    • INCISION AND DRAINAGE ABSCESS      DR. AMOR - INSECT BITE - YEARS AGO   • [place] RT/LT HEART CATHETERS N/A 2019    Procedure: Percutaneous Coronary Intervention;  Surgeon: Emmanuelle Good MD;  Location:  [facility];  Service: Cardiovascular   • WOUND EXPLORATION TRUNK Right 2019    Procedure: excisional debridement skin and subcutaneous tissue right abdominal wall 5.1 x 2.3 x 2.4 cm;  Surgeon: Bryan Amor MD;  Location: [facility] OR;  Service: General         FAMILY HISTORY  Family History   Problem Relation Age of Onset   • Diabetes Sister    • Malig Hyperthermia Neg Hx          SOCIAL HISTORY  Social History     Occupational History   • Not on file   Tobacco Use   • Smoking status: Former Smoker     Packs/day: 0.50     Years: 40.00     Pack years: 20.00     Types: Cigarettes     Quit date: 2019     Years since quittin.5   • Smokeless tobacco: Never Used   Substance and Sexual Activity   • Alcohol use: No     Alcohol/week: 0.0 standard drinks     Comment: Caffeine use: 1 cup daily   • Drug use: No   • Sexual activity: Defer         CURRENT MEDICATIONS    Current Outpatient Medications:   •  clopidogrel (PLAVIX) 75 MG tablet, Take 1 tablet by mouth Daily.,  Disp: 90 tablet, Rfl: 3  •  losartan (Cozaar) 50 MG tablet, Take 1 tablet by mouth Every Night., Disp: 90 tablet, Rfl: 3  •  Multiple Vitamins-Minerals (MULTIVITAMIN WITH MINERALS) tablet tablet, Take 1 tablet by mouth Daily., Disp: , Rfl:   •  rosuvastatin (CRESTOR) 5 MG tablet, Take 1 tablet by mouth Daily., Disp: 30 tablet, Rfl: 11    ALLERGIES  Tape    VITALS  Vitals:    08/12/21 1020   BP: 160/82   BP Location: Left arm   Patient Position: Sitting   Cuff Size: Adult   Weight: 83.9 kg (185 lb)       LAST RESULTS   Hospital Outpatient Visit on 07/23/2021   Component Date Value Ref Range Status   • Target HR (85%) 07/23/2021 128  bpm Final   • Max. Pred. HR (100%) 07/23/2021 151  bpm Final   • BH CV STRESS PROTOCOL 1 07/23/2021 Mike   Final   • Stage 1 07/23/2021 1   Final   • Duration Min Stage 1 07/23/2021 3   Final   • Duration Sec Stage 1 07/23/2021 0   Final   • Grade Stage 1 07/23/2021 10   Final   • Speed Stage 1 07/23/2021 1.7   Final   • BH CV STRESS METS STAGE 1 07/23/2021 5   Final   • Baseline HR 07/23/2021 72  bpm Final   • Baseline BP 07/23/2021 192/97  mmHg Final   • O2 sat rest 07/23/2021 98  % Final   • HR Stage 1 07/23/2021 92   Final   • BP Stage 1 07/23/2021 173/86   Final   • Stage 2 07/23/2021 2   Final   • HR Stage 2 07/23/2021 112   Final   • BP Stage 2 07/23/2021 185/83   Final   • Duration Min Stage 2 07/23/2021 3   Final   • Duration Sec Stage 2 07/23/2021 0   Final   • Grade Stage 2 07/23/2021 12   Final   • Speed Stage 2 07/23/2021 2.5   Final   • BH CV STRESS METS STAGE 2 07/23/2021 7.5   Final   • Stage 3 07/23/2021 3   Final   • HR Stage 3 07/23/2021 135   Final   • BP Stage 3 07/23/2021 195/74   Final   • Duration Min Stage 3 07/23/2021 3   Final   • Duration Sec Stage 3 07/23/2021 0   Final   • Grade Stage 3 07/23/2021 14   Final   • Speed Stage 3 07/23/2021 3.4   Final   • BH CV STRESS METS STAGE 3 07/23/2021 10.0   Final   • Peak HR 07/23/2021 135  bpm Final   • Percent Max  Pred HR 07/23/2021 89.40  % Final   • Percent Target HR 07/23/2021 105  % Final   • Peak BP 07/23/2021 195/97  mmHg Final   • O2 sat peak 07/23/2021 98  % Final   • Recovery HR 07/23/2021 76  bpm Final   • Recovery BP 07/23/2021 163/92  mmHg Final   • Exercise duration (min) 07/23/2021 9  min Final   • Exercise duration (sec) 07/23/2021 1  sec Final   • Estimated workload 07/23/2021 10.2  METS Final     Treadmill Stress Test    Result Date: 7/23/2021  Narrative: · Findings consistent with a normal ECG stress test. · The Duke Treadmill Score of 9.02 is consistent with a Low risk for ischemic heart disease.        PHYSICAL EXAM  Debilities/Disabilities Identified: None  Emotional Behavior: Appropriate  Physical Exam 3 and half centimeter blister on the medial right foot there is no evidence of cellulitis.  Appears to contain old blood.  The risk benefits and options were discussed with him in detail.  The site was prepped the blister was sharply unroofed.  Area where he attempted to I&D the site was densely adherent to the subcutaneous tissue and this was sharply debrided.  He tolerated well he was dressed in Silvadene.    ASSESSMENT  Foot wound        PLAN  Shower daily Silvadene to the site.  I will see him back in the office in 1 week

## 2021-08-17 ENCOUNTER — OFFICE VISIT (OUTPATIENT)
Dept: SURGERY | Facility: CLINIC | Age: 71
End: 2021-08-17

## 2021-08-17 ENCOUNTER — DOCUMENTATION (OUTPATIENT)
Dept: CARDIOLOGY | Facility: CLINIC | Age: 71
End: 2021-08-17

## 2021-08-17 DIAGNOSIS — T14.8XXA OPEN WOUND: Primary | ICD-10-CM

## 2021-08-17 PROCEDURE — 99212 OFFICE O/P EST SF 10 MIN: CPT | Performed by: SURGERY

## 2021-08-17 NOTE — PROGRESS NOTES
Patient presents for 5 day fu on rt foot pressue injury. He states that it is doing much better and he is without complaints.  He is here for follow-up on his foot wound.  It is improved.  The central area still has some necrotic debris I recommended he clean with peroxide and a Q-tip then shower daily then apply the Silvadene cream.  I will see him back in 3 weeks

## 2021-08-17 NOTE — PROGRESS NOTES
Patient stopped by today to advise you of his progress since being removed from rosuvastatin. He is doing well, without leg cramps.  He will get his updated blood work the last week of August.   thanks

## 2021-09-07 ENCOUNTER — OFFICE VISIT (OUTPATIENT)
Dept: SURGERY | Facility: CLINIC | Age: 71
End: 2021-09-07

## 2021-09-07 VITALS
BODY MASS INDEX: 26.66 KG/M2 | DIASTOLIC BLOOD PRESSURE: 76 MMHG | HEIGHT: 69 IN | SYSTOLIC BLOOD PRESSURE: 160 MMHG | WEIGHT: 180 LBS

## 2021-09-07 DIAGNOSIS — T14.8XXA OPEN WOUND: Primary | ICD-10-CM

## 2021-09-07 PROCEDURE — 99212 OFFICE O/P EST SF 10 MIN: CPT | Performed by: SURGERY

## 2021-09-07 NOTE — PROGRESS NOTES
F/u right foot wound.  Feels like the area has improved.  He has been cleaning it with a soft brush and peroxide and has stopped using the Silvadene.  Centrally he still has some adherent exudate and necrotic debris.  I pointed this out what needed to be removed.  He wants to continue to try to do this himself as opposed to proceed with sharp debridement here in the office for which she would require that I hold his Eliquis prior to the procedure.  I will see him back in 3 weeks if is not improved at that time I I think we have to consider local wound care failure and will need debridement.

## 2021-09-17 ENCOUNTER — DOCUMENTATION (OUTPATIENT)
Dept: CARDIOLOGY | Facility: CLINIC | Age: 71
End: 2021-09-17

## 2021-09-17 NOTE — PROGRESS NOTES
Patient dropped off labs dated 9/9/2021.  CBC shows H&H 14.8 44.9 and unremarkable.  A CMP shows a glucose of 91, creatinine 0.97, sodium 142 potassium 4.7.  ALT AST within normal limits.  A lipid panel shows total cholesterol 137, triglycerides 128, HDL 44 and LDL 70.  His hemoglobin A1c  was 6.0

## 2021-09-28 ENCOUNTER — OFFICE VISIT (OUTPATIENT)
Dept: SURGERY | Facility: CLINIC | Age: 71
End: 2021-09-28

## 2021-09-28 VITALS
WEIGHT: 179 LBS | HEIGHT: 69 IN | BODY MASS INDEX: 26.51 KG/M2 | SYSTOLIC BLOOD PRESSURE: 130 MMHG | DIASTOLIC BLOOD PRESSURE: 74 MMHG

## 2021-09-28 DIAGNOSIS — T14.8XXA OPEN WOUND: Primary | ICD-10-CM

## 2021-09-28 PROCEDURE — 99212 OFFICE O/P EST SF 10 MIN: CPT | Performed by: SURGERY

## 2021-09-28 NOTE — PROGRESS NOTES
F/u open foot wound. Area is improving.  He is here today for follow-up on his foot wound.  He says he has been cleaning it with the tube brush in the shower.  The wound is almost sealed it is open for about 1 mm there is no evidence of any further necrotic debris.  He can stop using the manual debridement methods continue daily shower I will see him back.

## 2022-01-18 ENCOUNTER — TELEPHONE (OUTPATIENT)
Dept: CARDIOLOGY | Facility: CLINIC | Age: 72
End: 2022-01-18

## 2022-01-18 NOTE — TELEPHONE ENCOUNTER
Patient wanted to know if he could have the COVID booster shot?  He stated that he has had the first 2 shots but he stated that he was told that he could not get the booster. Please advise.    CB: [phone]    Thanks,  Sara DAWSON

## 2022-01-18 NOTE — TELEPHONE ENCOUNTER
I called and spoke with the patient and told him the above.  He verbalized understanding.    Sara

## 2022-01-27 RX ORDER — LOSARTAN POTASSIUM 50 MG/1
50 TABLET ORAL NIGHTLY
Qty: 90 TABLET | Refills: 0 | Status: SHIPPED | OUTPATIENT
Start: 2022-01-27 | End: 2022-04-29

## 2022-03-01 ENCOUNTER — TRANSCRIBE ORDERS (OUTPATIENT)
Dept: ADMINISTRATIVE | Facility: HOSPITAL | Age: 72
End: 2022-03-01

## 2022-03-01 DIAGNOSIS — Z13.9 SCREENING DUE: Primary | ICD-10-CM

## 2022-03-02 ENCOUNTER — DOCUMENTATION (OUTPATIENT)
Dept: CARDIOLOGY | Facility: CLINIC | Age: 72
End: 2022-03-02

## 2022-03-02 NOTE — PROGRESS NOTES
Labs received dated 2/20 8/2022.  CMP was unremarkable with a creatinine of 0.95 and normal liver function tests.  Lipid panel revealed total cholesterol 138, triglycerides 136, HDL 45 and triglycerides 69.  Hemoglobin A1c 5.8.  PSA one 1.0.  CBC unremarkable.  I will have these scanned into epic.

## 2022-03-07 ENCOUNTER — HOSPITAL ENCOUNTER (OUTPATIENT)
Dept: CT IMAGING | Facility: HOSPITAL | Age: 72
Discharge: HOME | End: 2022-03-07
Admitting: NURSE PRACTITIONER

## 2022-03-07 DIAGNOSIS — Z13.9 SCREENING DUE: ICD-10-CM

## 2022-03-07 PROCEDURE — 71271 CT THORAX LUNG CANCER SCR C-: CPT

## 2022-04-28 NOTE — TELEPHONE ENCOUNTER
"Last OV-07/12/21-JF  Next OV-none    Plan:       1. CAD, s/p OM2 stent. - denies angina  2. Hyperlipidemia - taking 5 mg of atorvastatin daily.  He has been having intermittent LE muscle aches at night for the last year.  Worse in the last several weeks.  I  Am having him stop his Lipitor for 2 weeks and call.  If his symptoms resolve, would suggest a water soluble statin.  3. Retention, goal less than 120/80 - on recheck 140/80.  States his home Bps are \"always good\".  Just gets very nervous in MD office.  4. .    5.  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        "

## 2022-04-29 RX ORDER — LOSARTAN POTASSIUM 50 MG/1
TABLET ORAL
Qty: 90 TABLET | Refills: 0 | Status: SHIPPED | OUTPATIENT
Start: 2022-04-29 | End: 2022-07-11

## 2022-05-17 RX ORDER — CLOPIDOGREL BISULFATE 75 MG/1
TABLET ORAL
Qty: 90 TABLET | Refills: 0 | Status: SHIPPED | OUTPATIENT
Start: 2022-05-17 | End: 2022-08-12

## 2022-05-17 NOTE — TELEPHONE ENCOUNTER
Rx Refill Note  Requested Prescriptions     Pending Prescriptions Disp Refills   • clopidogrel (PLAVIX) 75 MG tablet [Pharmacy Med Name: Clopidogrel Bisulfate 75 MG Oral Tablet] 90 tablet 0     Sig: Take 1 tablet by mouth once daily      Last office visit with prescribing clinician: 7/12/2021    KATE  Next office visit with prescribing clinician: Visit date not found            Antoinette Moore MA  05/17/22, 09:09 EDT

## 2022-06-03 ENCOUNTER — TRANSCRIBE ORDERS (OUTPATIENT)
Dept: ADMINISTRATIVE | Facility: HOSPITAL | Age: 72
End: 2022-06-03

## 2022-06-03 DIAGNOSIS — R91.1 PULMONARY NODULE: Primary | ICD-10-CM

## 2022-06-07 ENCOUNTER — TELEPHONE (OUTPATIENT)
Dept: CARDIOLOGY | Facility: CLINIC | Age: 72
End: 2022-06-07

## 2022-06-07 NOTE — TELEPHONE ENCOUNTER
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  He just went and got his DOT physical and they stated that he was clear and just needed a letter from us stating that he was ok to drive.  Please advise.    CB: [phone]    Thanks,  Sara

## 2022-06-09 ENCOUNTER — APPOINTMENT (OUTPATIENT)
Dept: CT IMAGING | Facility: HOSPITAL | Age: 72
End: 2022-06-09

## 2022-06-15 ENCOUNTER — OFFICE VISIT (OUTPATIENT)
Dept: CARDIOLOGY | Facility: CLINIC | Age: 72
End: 2022-06-15

## 2022-06-15 ENCOUNTER — HOSPITAL ENCOUNTER (OUTPATIENT)
Dept: CT IMAGING | Facility: HOSPITAL | Age: 72
End: 2022-06-15

## 2022-06-15 ENCOUNTER — HOSPITAL ENCOUNTER (OUTPATIENT)
Dept: CT IMAGING | Facility: HOSPITAL | Age: 72
Discharge: HOME | End: 2022-06-15
Admitting: NURSE PRACTITIONER

## 2022-06-15 VITALS
SYSTOLIC BLOOD PRESSURE: 140 MMHG | DIASTOLIC BLOOD PRESSURE: 78 MMHG | BODY MASS INDEX: 27.85 KG/M2 | OXYGEN SATURATION: 98 % | WEIGHT: 188 LBS | HEIGHT: 69 IN | HEART RATE: 68 BPM

## 2022-06-15 DIAGNOSIS — R91.1 PULMONARY NODULE: ICD-10-CM

## 2022-06-15 DIAGNOSIS — I10 ESSENTIAL HYPERTENSION: ICD-10-CM

## 2022-06-15 DIAGNOSIS — E78.2 MIXED HYPERLIPIDEMIA: ICD-10-CM

## 2022-06-15 DIAGNOSIS — I21.4 NSTEMI (NON-ST ELEVATED MYOCARDIAL INFARCTION): Primary | ICD-10-CM

## 2022-06-15 PROCEDURE — 71250 CT THORAX DX C-: CPT

## 2022-06-15 PROCEDURE — 93000 ELECTROCARDIOGRAM COMPLETE: CPT | Performed by: NURSE PRACTITIONER

## 2022-06-15 PROCEDURE — 99214 OFFICE O/P EST MOD 30 MIN: CPT | Performed by: NURSE PRACTITIONER

## 2022-06-15 RX ORDER — MELATONIN
1000 DAILY
COMMUNITY

## 2022-06-15 NOTE — PROGRESS NOTES
Date of Office Visit: 06/15/2022  Encounter Provider: CHRIS Duvall  Place of Service: [facility]  Patient Name: Justo Castellano  :1950  Primary Cardiologist: Dr. Rodriguez    CC:  Annual cardiac evaluation    Dear Cinda    HPI: Justo Castellano is a pleasant 72 y.o. male who presents 06/15/2022 for cardiac follow up. I have reviewed his past medical records including notes, labs and testing in preparation for today's visit.    He presented to the [facility] ER on 19 with chest burning and had an unremarkable EKG but a troponin of 0.231.   He was transferred to [facility] where he had a cardiac catheterization for NSTEMI.   He underwent stenting of OM2 receiving 2.5 x 28 mm Xience ABDULKADIR stent.  This caused 90% stenosis in the circumflex which was then opened with kissing balloon technique into circumflex and OM 2.     Echo done 3/4/19 showed ejection fraction 60-65% with grade 1 diastolic dysfunction and mild mitral insufficiency.  He had a treadmill stress study exercising on a Mike protocol for 8 minutes done 3/4/19.  This showed no acute ST changes.  PVCs were noted with exercise.  Patient was hypertensive with exercise.      Echo done 3/4/2019 showed ejection fraction 61-65% with grade 1 diastolic dysfunction and mild mitral insufficiency.  He had no ischemic ECG changes noted with a treadmill stress study done 3/4/2019.  PVCs were noted during exercise.  This was all done in response to chest pain.  He was hypertensive with exercise,    He was seen 2021 and was doing well.  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    2021 Interpretation Summary  · Findings consistent with a normal ECG stress test.  · The Duke Treadmill Score of 9.02 is consistent with a Low risk for ischemic heart disease.     Labs received dated .  CMP was unremarkable with a creatinine of 0.95 and normal liver function tests.  Lipid panel  revealed total cholesterol 138, triglycerides 136, HDL 45 and triglycerides 69.  Hemoglobin A1c 5.8.  PSA one 1.0.  CBC unremarkable     He is here today for his annual cardiac evaluation.  He states he feels well and is done well.  He denies any palpitations, shortness of breath, lower extremity edema.  He has not had any dizziness, lightheadedness, syncope or presyncopal episodes.  He denies any chest pain or chest pressure or fatigue.  He states he walks at least 2 miles every day and can go up and down hills without any shortness of breath.  Take his medications as directed.  Past Medical History:   Diagnosis Date   • Antiplatelet or antithrombotic long-term use     Plavix   • Coronary artery disease 02/2019    stent placed   • Essential hypertension 3/22/2019   • GERD (gastroesophageal reflux disease)    • Mixed hyperlipidemia 3/22/2019   • Myocardial infarct (HCC) 02/2019    Dr Rodriguez follows       Past Surgical History:   Procedure Laterality Date   • CARDIAC CATHETERIZATION N/A 2/8/2019    Procedure: Left Heart Cath;  Surgeon: Emmanuelle Good MD;  Location: [facility] CATH INVASIVE LOCATION;  Service: Cardiovascular   • CARDIAC CATHETERIZATION N/A 2/8/2019    Procedure: Coronary angiography;  Surgeon: Emmanuelle Good MD;  [facility];  Service: Cardiovascular   • CARDIAC CATHETERIZATION N/A 2/8/2019    Procedure: Left ventriculography;  Surgeon: Emmanuelle Good MD;  [facility];  Service: Cardiovascular   • CARDIAC CATHETERIZATION N/A 2/8/2019    Procedure: Stent ABDULKADIR coronary;  Surgeon: Emmanuelle Good MD;  [facility];  Service: Cardiovascular   • HAND SURGERY Right    • INCISION AND DRAINAGE ABSCESS      DR. RIVERA - INSECT BITE - YEARS AGO   • [place] RT/LT HEART CATHETERS N/A 2/8/2019    Procedure: Percutaneous Coronary Intervention;  Surgeon: Emmanuelle Good MD;  [facility];  Service:  Cardiovascular   • WOUND EXPLORATION TRUNK Right 6/26/2019    Procedure: excisional debridement skin and subcutaneous tissue right abdominal wall 5.1 x 2.3 x 2.4 cm;  Surgeon: Bryan Amor MD;  Location: [facility];  Service: General       Social History     Socioeconomic History   • Marital status:    Tobacco Use   • Smoking status: Former Smoker     Packs/day: 0.50     Years: 40.00     Pack years: 20.00     Types: Cigarettes     Quit date: 02/2019     Years since quitting: 3.3   • Smokeless tobacco: Never Used   Substance and Sexual Activity   • Alcohol use: No     Alcohol/week: 0.0 standard drinks     Comment: Caffeine use: 1 cup daily   • Drug use: No   • Sexual activity: Defer       Family History   Problem Relation Age of Onset   • Diabetes Sister    • Malig Hyperthermia Neg Hx        The following portion of the patient's history were reviewed and updated as appropriate: past medical history, past surgical history, past social history, past family history, allergies, current medications, and problem list.    Review of Systems   Constitutional: Negative for diaphoresis, fever and malaise/fatigue.   HENT: Negative for congestion, hearing loss, hoarse voice, nosebleeds and sore throat.    Eyes: Negative for photophobia, vision loss in left eye, vision loss in right eye and visual disturbance.   Cardiovascular: Negative for chest pain, dyspnea on exertion, irregular heartbeat, leg swelling, near-syncope, orthopnea, palpitations, paroxysmal nocturnal dyspnea and syncope.   Respiratory: Negative for cough, hemoptysis, shortness of breath, sleep disturbances due to breathing, snoring, sputum production and wheezing.    Endocrine: Negative for cold intolerance, heat intolerance, polydipsia, polyphagia and polyuria.   Hematologic/Lymphatic: Negative for bleeding problem. Does not bruise/bleed easily.   Skin: Negative for color change, dry skin, poor wound healing, rash and suspicious lesions.  "  Musculoskeletal: Negative for arthritis, back pain, falls, gout, joint pain, joint swelling, muscle cramps, muscle weakness and myalgias.   Gastrointestinal: Negative for bloating, abdominal pain, constipation, diarrhea, dysphagia, melena, nausea and vomiting.   Neurological: Negative for excessive daytime sleepiness, dizziness, headaches, light-headedness, loss of balance, numbness, paresthesias, seizures, vertigo and weakness.   Psychiatric/Behavioral: Negative for depression, memory loss and substance abuse. The patient is not nervous/anxious.        Allergies   Allergen Reactions   • Tape Rash     Plastic or silk/fabric tape         Current Outpatient Medications:   •  cholecalciferol (VITAMIN D3) 25 MCG (1000 UT) tablet, Take 1,000 Units by mouth Daily., Disp: , Rfl:   •  clopidogrel (PLAVIX) 75 MG tablet, Take 1 tablet by mouth once daily, Disp: 90 tablet, Rfl: 0  •  losartan (COZAAR) 50 MG tablet, TAKE 1 TABLET BY MOUTH ONCE DAILY AT NIGHT, Disp: 90 tablet, Rfl: 0  •  Multiple Vitamins-Minerals (MULTIVITAMIN WITH MINERALS) tablet tablet, Take 1 tablet by mouth Daily., Disp: , Rfl:   •  rosuvastatin (CRESTOR) 5 MG tablet, Take 1 tablet by mouth Daily., Disp: 30 tablet, Rfl: 11        Objective:     Vitals:    06/15/22 0850 06/15/22 0912   BP: 178/88 140/78   Pulse: 68    SpO2: 98%    Weight: 85.3 kg (188 lb)    Height: 175.3 cm (69\")      Body mass index is 27.76 kg/m².      Vitals reviewed.   Constitutional:       General: Not in acute distress.     Appearance: Healthy appearance. Well-developed.   Eyes:      General:         Right eye: No discharge.         Left eye: No discharge.      Conjunctiva/sclera: Conjunctivae normal.   HENT:      Head: Normocephalic and atraumatic.      Right Ear: External ear normal.      Left Ear: External ear normal.      Nose: Nose normal.   Neck:      Thyroid: No thyromegaly.      Vascular: No JVD.      Trachea: No tracheal deviation.      Lymphadenopathy: No cervical " adenopathy.   Pulmonary:      Effort: Pulmonary effort is normal. No respiratory distress.      Breath sounds: Normal breath sounds. No wheezing. No rales.   Chest:      Chest wall: Not tender to palpatation.   Cardiovascular:      Normal rate. Regular rhythm.      No gallop.   Pulses:     Intact distal pulses.   Edema:     Peripheral edema absent.   Abdominal:      General: There is no distension.      Palpations: Abdomen is soft.      Tenderness: There is no abdominal tenderness.   Musculoskeletal: Normal range of motion.         General: No tenderness or deformity.      Cervical back: Normal range of motion and neck supple. Skin:     General: Skin is warm and dry.      Findings: No erythema or rash.   Neurological:      Mental Status: Alert and oriented to person, place, and time.      Coordination: Coordination normal.   Psychiatric:         Attention and Perception: Attention normal.         Mood and Affect: Mood normal.         Speech: Speech normal.         Behavior: Behavior normal. Behavior is cooperative.         Thought Content: Thought content normal.         Cognition and Memory: Cognition normal.         Judgment: Judgment normal.               ECG 12 Lead    Date/Time: 6/15/2022 8:58 AM  Performed by: Olga Valdez APRN  Authorized by: Olga Valdez APRN   Comparison: compared with previous ECG from 7/12/2021  Similar to previous ECG  Rhythm: sinus rhythm  Rate: normal  Conduction: conduction normal  ST Segments: ST segments normal  T Waves: T waves normal  QRS axis: normal    Clinical impression: normal ECG              Assessment:       Diagnosis Plan   1. NSTEMI (non-ST elevated myocardial infarction) (HCC)     2. Essential hypertension     3. Mixed hyperlipidemia            Plan:   1.  CAD, s/p OM2 stent. - Denies angina  2. Hyperlipidemia -continue lipid-lowering therapy.  Labs as above.  Good control with rosuvastatin 5 mg daily.   3. Hypertension -  goal less than 120/80 - High at beginning  of visit, recheck 140/78  4. .    5.  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 Thank you.       Sincerely,       CHRIS Duvall      Current Outpatient Medications:   •  cholecalciferol (VITAMIN D3) 25 MCG (1000 UT) tablet, Take 1,000 Units by mouth Daily., Disp: , Rfl:   •  clopidogrel (PLAVIX) 75 MG tablet, Take 1 tablet by mouth once daily, Disp: 90 tablet, Rfl: 0  •  losartan (COZAAR) 50 MG tablet, TAKE 1 TABLET BY MOUTH ONCE DAILY AT NIGHT, Disp: 90 tablet, Rfl: 0  •  Multiple Vitamins-Minerals (MULTIVITAMIN WITH MINERALS) tablet tablet, Take 1 tablet by mouth Daily., Disp: , Rfl:   •  rosuvastatin (CRESTOR) 5 MG tablet, Take 1 tablet by mouth Daily., Disp: 30 tablet, Rfl: 11      Dictated utilizing Dragon dictation

## 2022-07-11 RX ORDER — ROSUVASTATIN CALCIUM 5 MG/1
TABLET, COATED ORAL
Qty: 30 TABLET | Refills: 6 | Status: SHIPPED | OUTPATIENT
Start: 2022-07-11 | End: 2023-02-10

## 2022-07-11 RX ORDER — LOSARTAN POTASSIUM 50 MG/1
TABLET ORAL
Qty: 90 TABLET | Refills: 1 | Status: SHIPPED | OUTPATIENT
Start: 2022-07-11 | End: 2023-01-24

## 2022-07-11 NOTE — TELEPHONE ENCOUNTER
Rx Refill Note  Requested Prescriptions     Pending Prescriptions Disp Refills   • rosuvastatin (CRESTOR) 5 MG tablet [Pharmacy Med Name: Rosuvastatin Calcium 5 MG Oral Tablet] 30 tablet 0     Sig: Take 1 tablet by mouth once daily   • losartan (COZAAR) 50 MG tablet [Pharmacy Med Name: Losartan Potassium 50 MG Oral Tablet] 90 tablet 0     Sig: TAKE 1 TABLET BY MOUTH ONCE DAILY AT NIGHT      Last office visit with prescribing clinician: 6/15/2022      Next office visit with prescribing clinician: 6/26/2023    CMP LIPID scanned date 2/22/22       Antoinette Moore MA  07/11/22, 11:48 EDT

## 2022-08-12 RX ORDER — CLOPIDOGREL BISULFATE 75 MG/1
TABLET ORAL
Qty: 90 TABLET | Refills: 2 | Status: SHIPPED | OUTPATIENT
Start: 2022-08-12

## 2023-01-24 RX ORDER — LOSARTAN POTASSIUM 50 MG/1
TABLET ORAL
Qty: 90 TABLET | Refills: 1 | Status: SHIPPED | OUTPATIENT
Start: 2023-01-24

## 2023-01-24 NOTE — TELEPHONE ENCOUNTER
Rx Refill Note  Requested Prescriptions     Pending Prescriptions Disp Refills   • losartan (COZAAR) 50 MG tablet [Pharmacy Med Name: Losartan Potassium 50 MG Oral Tablet] 90 tablet 0     Sig: TAKE 1 TABLET BY MOUTH ONCE DAILY AT NIGHT      Last office visit with prescribing clinician: 6/15/2022   Last telemedicine visit with prescribing clinician: 6/26/2023   Next office visit with prescribing clinician: Visit date not found                         Would you like a call back once the refill request has been completed: [] Yes [] No    If the office needs to give you a call back, can they leave a voicemail: [] Yes [] No    Antoinette Moore MA  01/24/23, 10:07 EST

## 2023-02-10 DIAGNOSIS — E78.2 MIXED HYPERLIPIDEMIA: Primary | ICD-10-CM

## 2023-02-10 RX ORDER — ROSUVASTATIN CALCIUM 5 MG/1
TABLET, COATED ORAL
Qty: 30 TABLET | Refills: 4 | Status: SHIPPED | OUTPATIENT
Start: 2023-02-10

## 2023-02-14 DIAGNOSIS — I21.4 NSTEMI (NON-ST ELEVATED MYOCARDIAL INFARCTION): ICD-10-CM

## 2023-02-14 DIAGNOSIS — I25.10 CORONARY ARTERY DISEASE INVOLVING NATIVE CORONARY ARTERY OF NATIVE HEART WITHOUT ANGINA PECTORIS: Primary | ICD-10-CM

## 2023-02-17 ENCOUNTER — HOSPITAL ENCOUNTER (OUTPATIENT)
Dept: CARDIOLOGY | Facility: HOSPITAL | Age: 73
Discharge: HOME | End: 2023-02-17
Admitting: NURSE PRACTITIONER
Payer: COMMERCIAL

## 2023-02-17 ENCOUNTER — TELEPHONE (OUTPATIENT)
Dept: CARDIOLOGY | Facility: CLINIC | Age: 73
End: 2023-02-17
Payer: COMMERCIAL

## 2023-02-17 DIAGNOSIS — I25.10 CORONARY ARTERY DISEASE INVOLVING NATIVE CORONARY ARTERY OF NATIVE HEART WITHOUT ANGINA PECTORIS: ICD-10-CM

## 2023-02-17 DIAGNOSIS — I21.4 NSTEMI (NON-ST ELEVATED MYOCARDIAL INFARCTION): ICD-10-CM

## 2023-02-17 LAB
BH CV STRESS BP STAGE 1: NORMAL
BH CV STRESS BP STAGE 2: NORMAL
BH CV STRESS BP STAGE 3: NORMAL
BH CV STRESS DURATION MIN STAGE 1: 3
BH CV STRESS DURATION MIN STAGE 2: 3
BH CV STRESS DURATION MIN STAGE 3: 3
BH CV STRESS DURATION SEC STAGE 1: 0
BH CV STRESS DURATION SEC STAGE 2: 0
BH CV STRESS DURATION SEC STAGE 3: 0
BH CV STRESS GRADE STAGE 1: 10
BH CV STRESS GRADE STAGE 2: 12
BH CV STRESS GRADE STAGE 3: 14
BH CV STRESS HR STAGE 1: 89
BH CV STRESS HR STAGE 2: 108
BH CV STRESS HR STAGE 3: 130
BH CV STRESS METS STAGE 1: 4.6
BH CV STRESS METS STAGE 2: 7.1
BH CV STRESS METS STAGE 3: 10.2
BH CV STRESS PROTOCOL 1: NORMAL
BH CV STRESS RECOVERY BP: NORMAL MMHG
BH CV STRESS RECOVERY HR: 77 BPM
BH CV STRESS SPEED STAGE 1: 1.7
BH CV STRESS SPEED STAGE 2: 2.5
BH CV STRESS SPEED STAGE 3: 3.4
BH CV STRESS STAGE 1: 1
BH CV STRESS STAGE 2: 2
BH CV STRESS STAGE 3: 3
MAXIMAL PREDICTED HEART RATE: 150 BPM
PERCENT MAX PREDICTED HR: 86.67 %
STRESS BASELINE BP: NORMAL MMHG
STRESS BASELINE HR: 70 BPM
STRESS O2 SAT REST: 99 %
STRESS PERCENT HR: 102 %
STRESS POST ESTIMATED WORKLOAD: 10.2 METS
STRESS POST EXERCISE DUR MIN: 9 MIN
STRESS POST EXERCISE DUR SEC: 0 SEC
STRESS POST O2 SAT PEAK: 99 %
STRESS POST PEAK BP: NORMAL MMHG
STRESS POST PEAK HR: 130 BPM
STRESS TARGET HR: 128 BPM

## 2023-02-17 PROCEDURE — 93017 CV STRESS TEST TRACING ONLY: CPT

## 2023-02-17 PROCEDURE — 93018 CV STRESS TEST I&R ONLY: CPT | Performed by: INTERNAL MEDICINE

## 2023-02-20 ENCOUNTER — DOCUMENTATION (OUTPATIENT)
Dept: CARDIOLOGY | Facility: CLINIC | Age: 73
End: 2023-02-20
Payer: COMMERCIAL

## 2023-02-20 NOTE — TELEPHONE ENCOUNTER
Yue,    I am at the main office or I would take care of this myself.  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  When you do this if you could let me know and I will call the patient to let him know he can come get the letter.    Thanks in advance!    Lexi Truong RN  [facility]  02/20/23 13:20 EST

## 2023-02-20 NOTE — TELEPHONE ENCOUNTER
Notified patient of results, patient verbalizes understanding.    Serge, he is needing a letter for his [employer] to [facility] [employer].  Can you type one up?  Please message me back when you complete this so I can let the patient know and arrange for him to pick it up!      Thanks!!    Lexi Truong RN  [facility]  02/20/23 09:15 EST

## 2023-02-20 NOTE — PROGRESS NOTES
We received labs dated 2/13/2023 that showed an unremarkable CBC with a normal H&H and platelet count.  CMP shows a normal glucose at 92, sodium and potassium within normal limits.  Liver function test normal.  Cholesterol panel showed total cholesterol 122, triglycerides 94, HDL 42 and LDL 62.  Hemoglobin A1c was 5.6.  TSH was 1.400.  PSA was 0.8.  These have been scanned into epic.  
Detail Level: Detailed
Detail Level: Zone
Detail Level: Simple

## 2023-02-24 ENCOUNTER — DOCUMENTATION (OUTPATIENT)
Dept: CARDIOLOGY | Facility: CLINIC | Age: 73
End: 2023-02-24
Payer: COMMERCIAL

## 2023-05-11 RX ORDER — CLOPIDOGREL BISULFATE 75 MG/1
TABLET ORAL
Qty: 90 TABLET | Refills: 2 | Status: SHIPPED | OUTPATIENT
Start: 2023-05-11

## 2023-05-11 NOTE — TELEPHONE ENCOUNTER
Rx Refill Note  Requested Prescriptions     Pending Prescriptions Disp Refills   • clopidogrel (PLAVIX) 75 MG tablet [Pharmacy Med Name: Clopidogrel Bisulfate 75 MG Oral Tablet] 90 tablet 0     Sig: Take 1 tablet by mouth once daily      Last office visit with prescribing clinician: 6/15/2022   Last telemedicine visit with prescribing clinician: 2/20/2023   Next office visit with prescribing clinician: Visit date not found                         Would you like a call back once the refill request has been completed: [] Yes [] No    If the office needs to give you a call back, can they leave a voicemail: [] Yes [] No    Antoinette Moore MA  05/11/23, 14:05 EDT

## 2023-06-13 ENCOUNTER — TELEPHONE (OUTPATIENT)
Dept: CARDIOLOGY | Facility: CLINIC | Age: 73
End: 2023-06-13
Payer: COMMERCIAL

## 2023-06-13 RX ORDER — ROSUVASTATIN CALCIUM 5 MG/1
TABLET, COATED ORAL
Qty: 30 TABLET | Refills: 0 | Status: SHIPPED | OUTPATIENT
Start: 2023-06-13

## 2023-06-13 RX ORDER — LOSARTAN POTASSIUM 50 MG/1
TABLET ORAL
Qty: 90 TABLET | Refills: 0 | Status: SHIPPED | OUTPATIENT
Start: 2023-06-13

## 2023-06-13 NOTE — TELEPHONE ENCOUNTER
I lm on pt vm:    Pt is needing an appointment to be seen at [facility] in August. Latisha Diaz will refill his Crestor and Losartan.

## 2023-08-04 ENCOUNTER — OFFICE VISIT (OUTPATIENT)
Dept: CARDIOLOGY | Facility: CLINIC | Age: 73
End: 2023-08-04
Payer: COMMERCIAL

## 2023-08-04 VITALS
DIASTOLIC BLOOD PRESSURE: 90 MMHG | BODY MASS INDEX: 27.55 KG/M2 | HEIGHT: 69 IN | SYSTOLIC BLOOD PRESSURE: 160 MMHG | WEIGHT: 186 LBS | RESPIRATION RATE: 16 BRPM | HEART RATE: 83 BPM | OXYGEN SATURATION: 98 %

## 2023-08-04 DIAGNOSIS — E78.2 MIXED HYPERLIPIDEMIA: ICD-10-CM

## 2023-08-04 DIAGNOSIS — I25.10 CORONARY ARTERY DISEASE INVOLVING NATIVE CORONARY ARTERY OF NATIVE HEART WITHOUT ANGINA PECTORIS: ICD-10-CM

## 2023-08-04 DIAGNOSIS — I10 ESSENTIAL HYPERTENSION: ICD-10-CM

## 2023-08-04 DIAGNOSIS — I21.4 NSTEMI (NON-ST ELEVATED MYOCARDIAL INFARCTION): Primary | ICD-10-CM

## 2023-08-04 RX ORDER — ROSUVASTATIN CALCIUM 5 MG/1
5 TABLET, COATED ORAL DAILY
Qty: 90 TABLET | Refills: 3 | Status: SHIPPED | OUTPATIENT
Start: 2023-08-04

## 2023-10-27 RX ORDER — LOSARTAN POTASSIUM 50 MG/1
TABLET ORAL
Qty: 90 TABLET | Refills: 0 | Status: SHIPPED | OUTPATIENT
Start: 2023-10-27

## 2024-01-10 ENCOUNTER — TELEPHONE (OUTPATIENT)
Dept: CARDIOLOGY | Facility: CLINIC | Age: 74
End: 2024-01-10
Payer: COMMERCIAL

## 2024-01-10 NOTE — TELEPHONE ENCOUNTER
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  He is supposed to see the MD on Friday and on Monday.  Please advise.    CB: [phone]    Sara

## 2024-01-12 ENCOUNTER — TELEPHONE (OUTPATIENT)
Dept: CARDIOLOGY | Facility: CLINIC | Age: 74
End: 2024-01-12
Payer: COMMERCIAL

## 2024-01-12 NOTE — TELEPHONE ENCOUNTER
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 Pt verbally understood and stated he will  today around 11 AM.

## 2024-01-22 RX ORDER — LOSARTAN POTASSIUM 50 MG/1
TABLET ORAL
Qty: 90 TABLET | Refills: 3 | Status: SHIPPED | OUTPATIENT
Start: 2024-01-22

## 2024-01-22 NOTE — TELEPHONE ENCOUNTER
Failed protocol    NOV-08/02/24-RM  LOV-08/04/23-MM    Coronary artery disease: He presented with NSTEMI in February 2019 and required stent to the OM 2; the stent called stenosis of the left circumflex and this was opened with kissing balloon technique into the left circumflex and OM 2.  There are no ischemic changes on his EKG done at the end of July.  He denies chest pain.  His medical therapy includes rosuvastatin, losartan, Plavix.  Hypertension: He likely has whitecoat hypertension.  Review of his BP log shows BP to be controlled with blood pressures 120s to 130s.  Hyperlipidemia: Lipids at goal when checked in July 2023; he is on 5 mg rosuvastatin daily.     Mr. Del Toro is a patient of Dr. Rodriguez's with coronary artery disease s/p stent to the OM 2 and kissing balloon technique due to stenosis of the left circumflex into OM 2.  He is doing well and I will make no medication changes today.  His blood pressure is high in office but he describes controlled blood pressure at home; his wife also uses his blood pressure device and gets readings that are normal for her.  I asked him to call the office if blood pressure is consistently greater than 140/80.  For now I will have him see Dr. Rodriguez in 1 year.

## 2024-02-12 RX ORDER — CLOPIDOGREL BISULFATE 75 MG/1
75 TABLET ORAL DAILY
Qty: 90 TABLET | Refills: 2 | Status: SHIPPED | OUTPATIENT
Start: 2024-02-12

## 2024-03-25 ENCOUNTER — TELEPHONE (OUTPATIENT)
Dept: CARDIOLOGY | Facility: CLINIC | Age: 74
End: 2024-03-25
Payer: COMMERCIAL

## 2024-03-25 RX ORDER — ROSUVASTATIN CALCIUM 5 MG/1
5 TABLET, COATED ORAL DAILY
Qty: 90 TABLET | Refills: 1 | Status: SHIPPED | OUTPATIENT
Start: 2024-03-25

## 2024-03-25 NOTE — TELEPHONE ENCOUNTER
Patient called needing a refill sent to [facility] in [place] on his rosuvastatin (CRESTOR) 5 MG tablet

## 2024-08-07 ENCOUNTER — TRANSCRIBE ORDERS (OUTPATIENT)
Dept: ADMINISTRATIVE | Facility: HOSPITAL | Age: 74
End: 2024-08-07
Payer: COMMERCIAL

## 2024-08-07 DIAGNOSIS — Z87.891 HISTORY OF SMOKING: ICD-10-CM

## 2024-08-07 DIAGNOSIS — Z12.2 SCREENING FOR LUNG CANCER: Primary | ICD-10-CM

## 2024-08-30 ENCOUNTER — HOSPITAL ENCOUNTER (OUTPATIENT)
Dept: CT IMAGING | Facility: HOSPITAL | Age: 74
Discharge: HOME | End: 2024-08-30
Admitting: NURSE PRACTITIONER
Payer: COMMERCIAL

## 2024-08-30 DIAGNOSIS — Z12.2 SCREENING FOR LUNG CANCER: ICD-10-CM

## 2024-08-30 DIAGNOSIS — Z87.891 HISTORY OF SMOKING: ICD-10-CM

## 2024-08-30 PROCEDURE — 71271 CT THORAX LUNG CANCER SCR C-: CPT

## 2024-09-27 ENCOUNTER — DOCUMENTATION (OUTPATIENT)
Dept: CARDIOLOGY | Facility: CLINIC | Age: 74
End: 2024-09-27

## 2024-09-27 ENCOUNTER — OFFICE VISIT (OUTPATIENT)
Dept: CARDIOLOGY | Facility: CLINIC | Age: 74
End: 2024-09-27
Payer: COMMERCIAL

## 2024-09-27 VITALS
WEIGHT: 183.7 LBS | HEART RATE: 70 BPM | RESPIRATION RATE: 20 BRPM | SYSTOLIC BLOOD PRESSURE: 156 MMHG | BODY MASS INDEX: 27.21 KG/M2 | OXYGEN SATURATION: 97 % | HEIGHT: 69 IN | DIASTOLIC BLOOD PRESSURE: 86 MMHG

## 2024-09-27 DIAGNOSIS — I10 ESSENTIAL HYPERTENSION: ICD-10-CM

## 2024-09-27 DIAGNOSIS — E78.2 MIXED HYPERLIPIDEMIA: ICD-10-CM

## 2024-09-27 DIAGNOSIS — I25.10 CORONARY ARTERY DISEASE INVOLVING NATIVE CORONARY ARTERY OF NATIVE HEART WITHOUT ANGINA PECTORIS: Primary | ICD-10-CM

## 2024-09-27 PROCEDURE — 93000 ELECTROCARDIOGRAM COMPLETE: CPT | Performed by: INTERNAL MEDICINE

## 2024-09-27 PROCEDURE — 99214 OFFICE O/P EST MOD 30 MIN: CPT | Performed by: INTERNAL MEDICINE

## 2024-10-31 ENCOUNTER — TELEPHONE (OUTPATIENT)
Dept: CARDIOLOGY | Facility: CLINIC | Age: 74
End: 2024-10-31
Payer: COMMERCIAL

## 2024-10-31 RX ORDER — ROSUVASTATIN CALCIUM 5 MG/1
5 TABLET, COATED ORAL DAILY
Qty: 90 TABLET | Refills: 3 | Status: SHIPPED | OUTPATIENT
Start: 2024-10-31

## 2024-10-31 NOTE — TELEPHONE ENCOUNTER
Patient requesting a refill of Rosuvastatin 5 mg called to [facility] in [place]     NOV-10/03/25-RM  LOV-09/27/24-RM    Plan:       Consider repeat stress nuclear perfusion study and echocardiogram next year.  His stress today last year was normal and he has no symptoms and exercises regularly.  No medication changes at this time.  Overall doing well.  See back in 1 year.

## 2024-11-11 RX ORDER — CLOPIDOGREL BISULFATE 75 MG/1
75 TABLET ORAL DAILY
Qty: 90 TABLET | Refills: 3 | Status: SHIPPED | OUTPATIENT
Start: 2024-11-11

## 2025-01-21 RX ORDER — LOSARTAN POTASSIUM 50 MG/1
50 TABLET ORAL NIGHTLY
Qty: 90 TABLET | Refills: 3 | Status: SHIPPED | OUTPATIENT
Start: 2025-01-21

## 2025-01-21 NOTE — TELEPHONE ENCOUNTER
Received refill request from [facility] [place] for refill. Per RM last office note no medication changes at this time.

## 2025-01-22 NOTE — TELEPHONE ENCOUNTER
Caller: Justo Castellano    Relationship: Self    Best call back number: [phone]    What is the best time to reach you: AFTER 12:30PM     Who are you requesting to speak with (clinical staff, provider,  specific staff member): CLINICAL    What was the call regarding: PT IS RETURNING A MISSED CALL.

## (undated) DEVICE — GLIDESHEATH BASIC HYDROPHILIC COATED INTRODUCER SHEATH: Brand: GLIDESHEATH

## (undated) DEVICE — LAG MINOR PROCEDURE: Brand: MEDLINE INDUSTRIES, INC.

## (undated) DEVICE — SUCTION CANISTER, 1000CC,SAFELINER: Brand: DEROYAL

## (undated) DEVICE — CATH VENT MIV RADL PIG ST TIP 5F 110CM

## (undated) DEVICE — HI-TORQUE WHISPER MS GUIDE WIRE .014 STRAIGHT TIP 3.0 CM X 190 CM: Brand: HI-TORQUE WHISPER

## (undated) DEVICE — CATH DIAG IMPULSE FL3.5 5F 100CM

## (undated) DEVICE — NC TREK CORONARY DILATATION CATHETER 3.5 MM X 12 MM / RAPID-EXCHANGE: Brand: NC TREK

## (undated) DEVICE — MINI TREK CORONARY DILATATION CATHETER 2.0 MM X 15 MM / RAPID-EXCHANGE: Brand: MINI TREK

## (undated) DEVICE — DEV INDEFLATOR

## (undated) DEVICE — PK CATH CARD 40

## (undated) DEVICE — TOWEL,OR,DSP,ST,BLUE,STD,4/PK,20PK/CS: Brand: MEDLINE

## (undated) DEVICE — GW EMR FIX EXCHG J STD .035 3MM 260CM

## (undated) DEVICE — SPNG GZ WOVN 4X4IN 12PLY 10/BX STRL

## (undated) DEVICE — MINI TREK CORONARY DILATATION CATHETER 1.50 MM X 8 MM / RAPID-EXCHANGE: Brand: MINI TREK

## (undated) DEVICE — PAD,ABDOMINAL,8"X7.5",STERILE,LF,1/PK: Brand: MEDLINE

## (undated) DEVICE — GLV SURG EUDERMIC PF LTX 8 STRL

## (undated) DEVICE — NC TREK CORONARY DILATATION CATHETER 2.5 MM X 12 MM / RAPID-EXCHANGE: Brand: NC TREK

## (undated) DEVICE — RUNTHROUGH NS EXTRA FLOPPY PTCA GUIDEWIRE: Brand: RUNTHROUGH

## (undated) DEVICE — 6F .070 XB 3 100CM: Brand: VISTA BRITE TIP

## (undated) DEVICE — SOL ANTISEP SCRB PVPI 7.5PCT 4OZ

## (undated) DEVICE — KT MANIFLD CARDIAC

## (undated) DEVICE — TRY SKINPREP DRYPREP

## (undated) DEVICE — PREP SOL POVIDONE/IODINE BT 4OZ

## (undated) DEVICE — CATH DIAG IMPULSE FR4 5F 100CM